# Patient Record
Sex: FEMALE | Race: WHITE | Employment: FULL TIME | URBAN - METROPOLITAN AREA
[De-identification: names, ages, dates, MRNs, and addresses within clinical notes are randomized per-mention and may not be internally consistent; named-entity substitution may affect disease eponyms.]

---

## 2017-11-30 LAB
AVERAGE GLUCOSE: NORMAL
CHOLESTEROL, TOTAL: 202 MG/DL
CHOLESTEROL/HDL RATIO: NORMAL
HBA1C MFR BLD: 8.3 %
HDLC SERPL-MCNC: 56 MG/DL (ref 35–70)
LDL CHOLESTEROL CALCULATED: 118 MG/DL (ref 0–160)
TRIGL SERPL-MCNC: 166 MG/DL
VLDLC SERPL CALC-MCNC: NORMAL MG/DL

## 2017-12-19 ENCOUNTER — CLINICAL DOCUMENTATION (OUTPATIENT)
Dept: PHARMACY | Facility: CLINIC | Age: 42
End: 2017-12-19

## 2017-12-19 ENCOUNTER — TELEPHONE (OUTPATIENT)
Dept: PHARMACY | Facility: CLINIC | Age: 42
End: 2017-12-19

## 2017-12-19 NOTE — PROGRESS NOTES
Pharmacy Pop Care Documentation:   Patient application received. Patient missing Urine albumin test yearly and MyChart account creation: pending as of today. Letter sent.

## 2017-12-19 NOTE — TELEPHONE ENCOUNTER
Called patient to inform missing dm office note, a1c and lipid. No answer left sophy BOUDREAUX  [address] [facility]  Direct: [phone]  Department, toll free: [phone], option 7

## 2017-12-19 NOTE — PROGRESS NOTES
Pharmacy Pop Care Documentation:      The application for Nichelle Smith for enrollment into the diabetes management program has been reviewed and accepted on 12/19/17.     [address] [facility] Documentation:     AVS received for required office visit on: 11/30/17

## 2018-01-10 ENCOUNTER — SCHEDULED TELEPHONE ENCOUNTER (OUTPATIENT)
Dept: PHARMACY | Facility: CLINIC | Age: 43
End: 2018-01-10

## 2018-01-10 RX ORDER — SUMATRIPTAN 100 MG/1
100 TABLET, FILM COATED ORAL
COMMUNITY

## 2018-01-10 NOTE — LETTER
[address]  [address] [facility], Yani [address] 10  Phone: [phone], option 7  Fax: [facility]   [address]           01/12/18     Joselyn Vela,    You are currently enrolled in the [address] [place] [address]. After your recent pharmacist visit, the below were identified as opportunities to assist you with your diabetes management. As we discussed:  - Please be sure to activate your [employer] account. This is a requirement of the program to remain eligible. An activation code has been emailed again to [email or web address], or you can call the [address] [place] [address] at [phone]. - Discuss \"statin\" (such as atorvastatin) and \"ACEi/ARB\" (such as lisinopril or losartan) with your provider and whether either of these are appropriate for you. If not, please have your provider send documentation as to why (such as blood pressure is normal and no protein in urine).   - Refill requests for Invokamet and the Agamatrix test strips and lancets were sent to Cash Lopez MD.  - Pneumococcal vaccine at PCP appt or available for $0 at local outpatient pharmacies (as long as not a [facility] or [facility] pharmacy)     Current medications eligible for copay waiver, up to $600, through [facility] ([facility]) (mail order) Pharmacy:  - Josiah Estes  - Generic ([address] [facility] pharmacy-stocked) insulin syringes and pen needles  - Agamatrix meter and supplies  Mail order pharmacy: [phone], option 0 - please allow 2 weeks for processing and shipping prescriptions; you will also still need to put a form of payment on file (if you haven't done so already)      (please see next page)  DM program gaps identified to remain eligible:   - Initial requirements, to be completed by 7/1/18: OV with provider for DM (1st), A1c (1st), On statin or contraindication(s) and On ACEi/ARB or contraindication(s)

## 2018-01-10 NOTE — TELEPHONE ENCOUNTER
[facility]) Employee Diabetes Program  =================================================================  Berto Mcdowell is a 38 y.o. female enrolled in the [address] [place] [address] Diabetes Program.    Medications:  As per current medication list (updated)  - SMBG: One Touch Ultra Mini (sts she \"should check once a day, but I don't\") - currently using these up, then does have the Agamatrix on hand when she runs out     Allergies:  No Known Allergies     Vitals/Labs:  BP Readings from Last 3 Encounters:   02/08/17 116/72   06/09/15 104/84   11/14/14 128/74     No results found for: Jewell Hidalgo  No results found for: LABA1C   From 12/19/17 Scan:  11/30/17 labs -  A1c: 8.3  TC: 202  HDL: 56  LDL: 118  (and note that UA not done)    Lab Results   Component Value Date    CHOL 193 02/06/2015     Lab Results   Component Value Date    TRIG 181 (H) 02/06/2015     Lab Results   Component Value Date    HDL 56 02/06/2015     Lab Results   Component Value Date    LDLCALC 101 (H) 02/06/2015     ALT   Date Value Ref Range Status   02/06/2015 50 (H) 0 - 32 U/L Final     Risk Factor Units Value   Sex M (for males) or F (for females) f   Age years 38   Race AA (for  Americans) or WH (for whites or others) wh   Total Cholesterol mg/dL 202   HDL-Cholesterol mg/dL 56   Systolic Blood Pressure mm Hg 116   Treatment for High Blood Pressure (if SBP >120) Y (for yes) or N (for no) n   Diabetes Y (for yes) or N (for no) y   Smoker Y (for yes) or N (for no) n     Your 10-Year ASCVD Risk (%) 0.9        CrCl cannot be calculated (Unknown ideal weight.). Immunizations: There is no immunization history on file for this patient.      Smoking Status:  History   Smoking Status    Never Smoker   Smokeless Tobacco    Not on file      ASSESSMENT:  *Will be activating [employer] tomorrow  Initial Program Requirements (to be completed by 7/1/2018):  [] OV with provider for DM (1st)  [] A1c (1st)  [] On statin or contraindication(s) Not

## 2018-01-12 NOTE — ADDENDUM NOTE
Encounter addended by: Kendra Medina MA on: 1/12/2018  1:00 PM<BR>    Actions taken: Letter status changed

## 2018-06-15 ENCOUNTER — EMPLOYEE WELLNESS (OUTPATIENT)
Dept: OTHER | Age: 43
End: 2018-06-15

## 2018-06-15 LAB
CHOLESTEROL, TOTAL: 215 MG/DL (ref 0–199)
GLUCOSE BLD-MCNC: 246 MG/DL (ref 74–107)
HDLC SERPL-MCNC: 66 MG/DL
LDL CHOLESTEROL CALCULATED: 119 MG/DL (ref 0–99)
TRIGL SERPL-MCNC: 149 MG/DL (ref 0–149)

## 2018-06-16 LAB — HBA1C MFR BLD: 8.7 % (ref 4.8–5.9)

## 2018-06-19 VITALS — WEIGHT: 180 LBS | BODY MASS INDEX: 30.9 KG/M2

## 2018-06-25 ENCOUNTER — TELEPHONE (OUTPATIENT)
Dept: PHARMACY | Facility: CLINIC | Age: 43
End: 2018-06-25

## 2018-06-28 ENCOUNTER — CLINICAL DOCUMENTATION (OUTPATIENT)
Dept: PHARMACY | Facility: CLINIC | Age: 43
End: 2018-06-28

## 2018-08-06 ENCOUNTER — CARE COORDINATION (OUTPATIENT)
Dept: CARE COORDINATION | Age: 43
End: 2018-08-06

## 2018-08-06 NOTE — CARE COORDINATION
RD outreach regarding employee beneficiary diabetes program. LM and introduced self/ explained reason for call. Request for call back to discuss DM management. RD left contact information. If no return call in one month, RD will f/u again.  RD signed onto care team.

## 2018-09-05 ENCOUNTER — CARE COORDINATION (OUTPATIENT)
Dept: CARE COORDINATION | Age: 43
End: 2018-09-05

## 2018-11-09 ENCOUNTER — TELEPHONE (OUTPATIENT)
Dept: PHARMACY | Facility: CLINIC | Age: 43
End: 2018-11-09

## 2018-11-09 NOTE — TELEPHONE ENCOUNTER
[facility]) Employee Diabetes Program  =================================================================  Ely Ovalle is a 41 y.o. female enrolled in the [address] [place] [address] Diabetes Program.    Program Requirements that need to be completed by 12/31/18 to remain eligible for program:  [] OV with provider for DM (2nd)  [] ACC/diabetes educator visit (if A1c over 8%) - has had RD outreach but not engaged, please call [phone] to schedule. [] A1c (2nd)  [] Urine protein  [] Pneumococcal vaccination: up to date  [] Influenza vaccination for 1355-2071 - should receive as requirement of employment  [] On ACEi/ARB or contraindication(s) - unable to assess as no urine albumin on file; /74 mmHg at 4/25/18 OV    Per pt's program application, preferred time/phone number to be contacted: work - [phone] - 8:30-5    Has read recent [address] reminder. Attempting to contact patient to discuss. No answer, LM at mobile number to please call back at [phone] option 7. Called home number, pt unavailable. Called work number listed on application ([phone]) - was transferred to pt's personal phone so LM to please call back.     Rebecca Ennis, PharmD, [address] Pharmacist  Direct: [phone]  Dept: [phone] (toll free [phone], option 7)

## 2018-12-07 ENCOUNTER — TELEPHONE (OUTPATIENT)
Dept: PHARMACY | Facility: CLINIC | Age: 43
End: 2018-12-07

## 2018-12-07 NOTE — TELEPHONE ENCOUNTER
[facility]) Employee Diabetes Program  =================================================================  Anthony Favorite is a 41 y.o. female enrolled in the [address] [place] [address] Diabetes Program.    Program Requirements that need to be completed by 12/31/18 to remain eligible for program:  [] OV with provider for DM (2nd)  [] ACC/diabetes educator visit (if A1c over 8%) - has had RD outreach but not engaged, please call [phone] to schedule. [] A1c (2nd)  [] Urine protein  [] Pneumococcal vaccination: up to date  [] Influenza vaccination for 0367-1004 - should receive as requirement of employment  [] On ACEi/ARB or contraindication(s) - unable to assess as no urine albumin on file; /74 mmHg at 4/25/18 OV    Attempting to contact patient at mobile number (preferred number per previous encounter) to discuss. No answer, LM to please call back at [phone] option 7.      Elizabeth Benz, PharmD, [address] Pharmacist  Direct: [phone]  Dept: [phone] (toll free [phone], option 7)

## 2018-12-07 NOTE — LETTER
[address]  [address], Yani [place] 10  Phone: [phone], option [address]   [address] [facility] [address]           12/13/18     Dear Anthony Auite,    Please note: This is the FINAL notice for the missing requirements listed below for the [facility]) Diabetes Management Program. If these requirements are not completed by December 31st, 2018, then you will be disenrolled from the program.    ? Be taking an \"ACEi/ARB\" medication, such as lisinopril or losartan, for blood pressure and/or to protect your kidneys (or provide documentation from your provider that you are not a candidate for one of these medications)   ? Second yearly visit with your provider  ? Second A1C  ? Urine microalbumin (once yearly)  ? Pneumonia vaccination (appears you are due for [id number]. This can be completed at a provider office visit or is available at a local in-network vaccination pharmacy (NOT a [facility] or [facility] pharmacy))  ? Engage with a [facility] ([facility]) Diabetes Educator at one of our hospital locations or an [address] [place] [address] by phone two (2) or more times. ? Please call [phone] to schedule. You must submit documentation of completion of requirements if your provider does not use the [address] [facility] charting system or if completed outside of [facility]. Return the documentation to [email or web address] or by fax at [phone]. Thank you,    [address] [place] [address],[address] Floor Team   [phone] Option #7   Email: [email or web address]   Fax Number: [phone]

## 2018-12-20 ENCOUNTER — HOSPITAL ENCOUNTER (OUTPATIENT)
Age: 43
Discharge: HOME | End: 2018-12-20
Payer: COMMERCIAL

## 2018-12-20 LAB
ALBUMIN SERPL-MCNC: 4.2 G/DL (ref 3.5–5.2)
ALP BLD-CCNC: 134 U/L (ref 35–104)
ALT SERPL-CCNC: 16 U/L (ref 0–32)
ANION GAP SERPL CALCULATED.3IONS-SCNC: 10 MMOL/L (ref 7–16)
AST SERPL-CCNC: 14 U/L (ref 0–31)
BASOPHILS ABSOLUTE: 0.07 E9/L (ref 0–0.2)
BASOPHILS RELATIVE PERCENT: 0.9 % (ref 0–2)
BILIRUB SERPL-MCNC: 0.4 MG/DL (ref 0–1.2)
BUN BLDV-MCNC: 12 MG/DL (ref 6–20)
CALCIUM SERPL-MCNC: 9.9 MG/DL (ref 8.6–10.2)
CHLORIDE BLD-SCNC: 100 MMOL/L (ref 98–107)
CHOLESTEROL, TOTAL: 244 MG/DL (ref 0–199)
CO2: 30 MMOL/L (ref 22–29)
CREAT SERPL-MCNC: 0.7 MG/DL (ref 0.5–1)
EOSINOPHILS ABSOLUTE: 0.42 E9/L (ref 0.05–0.5)
EOSINOPHILS RELATIVE PERCENT: 5.2 % (ref 0–6)
GFR AFRICAN AMERICAN: >60
GFR NON-AFRICAN AMERICAN: >60 ML/MIN/1.73
GLUCOSE BLD-MCNC: 220 MG/DL (ref 74–99)
HCT VFR BLD CALC: 46.4 % (ref 34–48)
HDLC SERPL-MCNC: 68 MG/DL
HEMOGLOBIN: 14.7 G/DL (ref 11.5–15.5)
IMMATURE GRANULOCYTES #: 0.02 E9/L
IMMATURE GRANULOCYTES %: 0.2 % (ref 0–5)
LDL CHOLESTEROL CALCULATED: 132 MG/DL (ref 0–99)
LYMPHOCYTES ABSOLUTE: 3.43 E9/L (ref 1.5–4)
LYMPHOCYTES RELATIVE PERCENT: 42.7 % (ref 20–42)
MCH RBC QN AUTO: 28.7 PG (ref 26–35)
MCHC RBC AUTO-ENTMCNC: 31.7 % (ref 32–34.5)
MCV RBC AUTO: 90.4 FL (ref 80–99.9)
MONOCYTES ABSOLUTE: 0.41 E9/L (ref 0.1–0.95)
MONOCYTES RELATIVE PERCENT: 5.1 % (ref 2–12)
NEUTROPHILS ABSOLUTE: 3.69 E9/L (ref 1.8–7.3)
NEUTROPHILS RELATIVE PERCENT: 45.9 % (ref 43–80)
PDW BLD-RTO: 11.5 FL (ref 11.5–15)
PLATELET # BLD: 391 E9/L (ref 130–450)
PMV BLD AUTO: 9.5 FL (ref 7–12)
POTASSIUM SERPL-SCNC: 4.1 MMOL/L (ref 3.5–5)
RBC # BLD: 5.13 E12/L (ref 3.5–5.5)
SODIUM BLD-SCNC: 140 MMOL/L (ref 132–146)
T4 FREE: 1.34 NG/DL (ref 0.93–1.7)
TOTAL PROTEIN: 7.4 G/DL (ref 6.4–8.3)
TRIGL SERPL-MCNC: 218 MG/DL (ref 0–149)
TSH SERPL DL<=0.05 MIU/L-ACNC: 5.97 UIU/ML (ref 0.27–4.2)
VLDLC SERPL CALC-MCNC: 44 MG/DL
WBC # BLD: 8 E9/L (ref 4.5–11.5)

## 2018-12-20 PROCEDURE — 80053 COMPREHEN METABOLIC PANEL: CPT

## 2018-12-20 PROCEDURE — 84443 ASSAY THYROID STIM HORMONE: CPT

## 2018-12-20 PROCEDURE — 36415 COLL VENOUS BLD VENIPUNCTURE: CPT

## 2018-12-20 PROCEDURE — 80061 LIPID PANEL: CPT

## 2018-12-20 PROCEDURE — 85025 COMPLETE CBC W/AUTO DIFF WBC: CPT

## 2018-12-20 PROCEDURE — 84439 ASSAY OF FREE THYROXINE: CPT

## 2018-12-26 ENCOUNTER — CARE COORDINATION (OUTPATIENT)
Dept: CARE COORDINATION | Age: 43
End: 2018-12-26

## 2019-03-11 ENCOUNTER — TELEPHONE (OUTPATIENT)
Dept: PHARMACY | Facility: CLINIC | Age: 44
End: 2019-03-11

## 2019-04-04 ENCOUNTER — PATIENT MESSAGE (OUTPATIENT)
Dept: PHARMACY | Facility: CLINIC | Age: 44
End: 2019-04-04

## 2019-04-10 LAB
AVERAGE GLUCOSE: NORMAL
HBA1C MFR BLD: 7.3 %

## 2019-06-18 ENCOUNTER — TELEPHONE (OUTPATIENT)
Dept: PHARMACY | Facility: CLINIC | Age: 44
End: 2019-06-18

## 2019-06-18 NOTE — TELEPHONE ENCOUNTER
Patient is still missing the following requirement for the DM Program that is due by July 1st, 2019:    1. Diabetes Visit with your physician in 2019 (First yearly visit)   2. Meet with a [facility]) clinical pharmacist in person at a hospital location or by phone   3. First A1C in 2019      Called patient to complete requirements for Diabetes Management Program.     Scheduled PharmD telephone appt for tomorrow at 10:30AM  Provided fax# to provide documentation of other 2 outstanding requirements. Marcia Foster CPhT.   Pharmacy Technician/Administrative [address] [facility]  Department, toll free: [phone], option 7

## 2019-06-19 ENCOUNTER — SCHEDULED TELEPHONE ENCOUNTER (OUTPATIENT)
Dept: PHARMACY | Facility: CLINIC | Age: 44
End: 2019-06-19

## 2019-06-19 RX ORDER — LISINOPRIL 2.5 MG/1
2.5 TABLET ORAL DAILY
COMMUNITY
End: 2020-05-06

## 2019-06-19 RX ORDER — PRAVASTATIN SODIUM 20 MG
20 TABLET ORAL DAILY
COMMUNITY
End: 2020-05-06

## 2019-06-19 NOTE — TELEPHONE ENCOUNTER
[facility]) Employee Diabetes Program  =================================================================  Liam Odell is a 41 y.o. female enrolled in the [address] [place] [address] Diabetes Program.    Medications:  Current Outpatient Medications   Medication Sig Dispense Refill    SUMAtriptan (IMITREX) 100 MG tablet Take 100 mg by mouth once as needed for Migraine      canagliflozin-metformin HCl (INVOKAMET)  MG Take 1 tablet by mouth 2 times daily (with meals)      levothyroxine (SYNTHROID) 100 MCG tablet Take 100 mcg by mouth Daily.  BUPROPION HCL ER, SR, PO Take 300 mg by mouth daily      *Trulicity - added    Current Pharmacy: established with [facility]  Current testing supplies/frequency: did not discuss    Allergies:  No Known Allergies     Vitals/Labs:  BP Readings from Last 3 Encounters:   02/08/17 116/72   06/09/15 104/84   11/14/14 128/74     No results found for: Leila Arely     Lab Results   Component Value Date    LABA1C 8.7 (H) 06/15/2018    LABA1C 8.3 11/30/2017     Lab Results   Component Value Date    CHOL 244 (H) 12/20/2018    TRIG 218 (H) 12/20/2018    HDL 68 12/20/2018    LDLCALC 132 (H) 12/20/2018     ALT   Date Value Ref Range Status   12/20/2018 16 0 - 32 U/L Final     AST   Date Value Ref Range Status   12/20/2018 14 0 - 31 U/L Final     The ASCVD Risk score (Tonya Matt et al., 2013) failed to calculate for the following reasons:     The systolic blood pressure is missing     Lab Results   Component Value Date    CREATININE 0.7 12/20/2018     Immunizations:  Immunization History   Administered Date(s) Administered    Influenza Virus Vaccine 10/16/2018    Pneumococcal Polysaccharide ([id number]) 12/28/2018      Social History:  Social History     Tobacco Use    Smoking status: Never Smoker   Substance Use Topics    Alcohol use: Not on file     ASSESSMENT:  Initial Program Requirements (Y indicates has completed for the year, N indicates needs to be completed by 7/1/19): No - OV with provider for DM (1st)  No - A1c (1st)  Yes? - On statin - pravastatin prescribed by provider in February, but has not yet started - states \"didn't want to add another medicine\"  Yes? - On ACEi/ARB - lisinopril prescribed by provider in February, but has not yet started  - states \"didn't want to add another medicine\"     Ongoing Program Requirements (Y indicates has completed for the year, N indicates needs to be completed by 12/31/19): No - OV with provider for DM (2nd)  No - ACC/diabetes educator visit (if A1c over 8%)  No - A1c (2nd)  No - Lipid panel  No - Urine microalbumin  Yes - Pneumococcal vaccination: up to date  No - Influenza vaccination for Fall 2019 - is [employer]  No - Medication adherence over 70% - appears adherent to bSafe per Savanna gutiérrez hx; Invokament 90-ds 8/31/18, 2/7/19 and 6/17/19 - pt sts she may \"occasionally\" miss    Formulary Medication Review:  Non-formulary or medications with cost-effective alternatives: n/a. Current medications eligible for copay waiver, up to $600, through [facility] ([facility]) (mail order) Pharmacy:  Daphne Crowell, lisinopril, pravastatin  - Generic ([facility] pharmacy-stocked) insulin syringes and pen needles  - Agamatrix or Prodigy meter and supplies     Diabetes Care:   - Glycemic Goal: <7.0% and directed by provider. States her most recent A1c was 7.3 (improved following addition of Trulicity); reports also continues to work on diet. Possibly some Invokamet BID adherence concerns. Other Considerations:  - Blood Pressure Goal: BP less than 140/90 mmHg due to history of DM: Unable to assess if at BP target. Per patient, BPs have been at goal; she is unsure of her urine/microalbumin results. Prescribed lisinopril 2.5mg daily in February, but has not yet started. - Lipids: prescribed pravastatin 20mg daily, but has not yet started.  Unable to calculate 10-year ASCVD risk, with limited info available, however may likely be a

## 2019-06-26 ENCOUNTER — CLINICAL DOCUMENTATION (OUTPATIENT)
Dept: PHARMACY | Facility: CLINIC | Age: 44
End: 2019-06-26

## 2019-09-10 LAB — MICROALBUMIN UR-MCNC: NEGATIVE

## 2019-11-21 ENCOUNTER — TELEPHONE (OUTPATIENT)
Dept: PHARMACY | Facility: CLINIC | Age: 44
End: 2019-11-21

## 2019-12-06 ENCOUNTER — TELEPHONE (OUTPATIENT)
Dept: PHARMACY | Facility: CLINIC | Age: 44
End: 2019-12-06

## 2019-12-09 ENCOUNTER — CLINICAL DOCUMENTATION (OUTPATIENT)
Dept: PHARMACY | Facility: CLINIC | Age: 44
End: 2019-12-09

## 2019-12-21 ENCOUNTER — HOSPITAL ENCOUNTER (OUTPATIENT)
Age: 44
Discharge: HOME | End: 2019-12-21
Payer: COMMERCIAL

## 2019-12-21 LAB
ALBUMIN SERPL-MCNC: 4.2 G/DL (ref 3.5–5.2)
ALP BLD-CCNC: 131 U/L (ref 35–104)
ALT SERPL-CCNC: 12 U/L (ref 0–32)
ANION GAP SERPL CALCULATED.3IONS-SCNC: 12 MMOL/L (ref 7–16)
AST SERPL-CCNC: 16 U/L (ref 0–31)
BASOPHILS ABSOLUTE: 0.06 E9/L (ref 0–0.2)
BASOPHILS RELATIVE PERCENT: 0.8 % (ref 0–2)
BILIRUB SERPL-MCNC: 0.3 MG/DL (ref 0–1.2)
BUN BLDV-MCNC: 12 MG/DL (ref 6–20)
CALCIUM SERPL-MCNC: 9.2 MG/DL (ref 8.6–10.2)
CHLORIDE BLD-SCNC: 97 MMOL/L (ref 98–107)
CHOLESTEROL, TOTAL: 177 MG/DL (ref 0–199)
CO2: 27 MMOL/L (ref 22–29)
CREAT SERPL-MCNC: 0.8 MG/DL (ref 0.5–1)
EOSINOPHILS ABSOLUTE: 0.16 E9/L (ref 0.05–0.5)
EOSINOPHILS RELATIVE PERCENT: 2.2 % (ref 0–6)
GFR AFRICAN AMERICAN: >60
GFR NON-AFRICAN AMERICAN: >60 ML/MIN/1.73
GLUCOSE BLD-MCNC: 157 MG/DL (ref 74–99)
HBA1C MFR BLD: 6.5 % (ref 4–5.6)
HCT VFR BLD CALC: 47.5 % (ref 34–48)
HDLC SERPL-MCNC: 63 MG/DL
HEMOGLOBIN: 15 G/DL (ref 11.5–15.5)
IMMATURE GRANULOCYTES #: 0.01 E9/L
IMMATURE GRANULOCYTES %: 0.1 % (ref 0–5)
LDL CHOLESTEROL CALCULATED: 96 MG/DL (ref 0–99)
LYMPHOCYTES ABSOLUTE: 2.7 E9/L (ref 1.5–4)
LYMPHOCYTES RELATIVE PERCENT: 37.8 % (ref 20–42)
MCH RBC QN AUTO: 29 PG (ref 26–35)
MCHC RBC AUTO-ENTMCNC: 31.6 % (ref 32–34.5)
MCV RBC AUTO: 91.7 FL (ref 80–99.9)
MONOCYTES ABSOLUTE: 0.46 E9/L (ref 0.1–0.95)
MONOCYTES RELATIVE PERCENT: 6.4 % (ref 2–12)
NEUTROPHILS ABSOLUTE: 3.76 E9/L (ref 1.8–7.3)
NEUTROPHILS RELATIVE PERCENT: 52.7 % (ref 43–80)
PDW BLD-RTO: 11.7 FL (ref 11.5–15)
PLATELET # BLD: 414 E9/L (ref 130–450)
PMV BLD AUTO: 9.5 FL (ref 7–12)
POTASSIUM SERPL-SCNC: 3.8 MMOL/L (ref 3.5–5)
RBC # BLD: 5.18 E12/L (ref 3.5–5.5)
SODIUM BLD-SCNC: 136 MMOL/L (ref 132–146)
TOTAL PROTEIN: 7.7 G/DL (ref 6.4–8.3)
TRIGL SERPL-MCNC: 91 MG/DL (ref 0–149)
TSH SERPL DL<=0.05 MIU/L-ACNC: 0.82 UIU/ML (ref 0.27–4.2)
VLDLC SERPL CALC-MCNC: 18 MG/DL
WBC # BLD: 7.2 E9/L (ref 4.5–11.5)

## 2019-12-21 PROCEDURE — 80053 COMPREHEN METABOLIC PANEL: CPT

## 2019-12-21 PROCEDURE — 85025 COMPLETE CBC W/AUTO DIFF WBC: CPT

## 2019-12-21 PROCEDURE — 80061 LIPID PANEL: CPT

## 2019-12-21 PROCEDURE — 83036 HEMOGLOBIN GLYCOSYLATED A1C: CPT

## 2019-12-21 PROCEDURE — 84443 ASSAY THYROID STIM HORMONE: CPT

## 2019-12-21 PROCEDURE — 36415 COLL VENOUS BLD VENIPUNCTURE: CPT

## 2020-05-05 ENCOUNTER — TELEPHONE (OUTPATIENT)
Dept: PHARMACY | Facility: CLINIC | Age: 45
End: 2020-05-05

## 2020-05-06 ENCOUNTER — SCHEDULED TELEPHONE ENCOUNTER (OUTPATIENT)
Dept: PHARMACY | Facility: CLINIC | Age: 45
End: 2020-05-06

## 2020-05-06 RX ORDER — BUPROPION HYDROCHLORIDE 100 MG/1
100 TABLET, EXTENDED RELEASE ORAL DAILY
COMMUNITY
Start: 2020-05-04

## 2020-05-06 RX ORDER — LEVOTHYROXINE SODIUM 112 UG/1
112 TABLET ORAL DAILY
COMMUNITY
Start: 2020-05-04

## 2020-05-06 RX ORDER — ERTUGLIFLOZIN AND METFORMIN HYDROCHLORIDE 2.5; 5 MG/1; MG/1
1 TABLET, FILM COATED ORAL 2 TIMES DAILY
COMMUNITY
Start: 2020-03-30

## 2020-08-05 ENCOUNTER — HOSPITAL ENCOUNTER (OUTPATIENT)
Age: 45
Discharge: HOME | End: 2020-08-05
Payer: COMMERCIAL

## 2020-08-05 LAB
ALBUMIN SERPL-MCNC: 4 G/DL (ref 3.5–5.2)
ALP BLD-CCNC: 151 U/L (ref 35–104)
ALT SERPL-CCNC: 12 U/L (ref 0–32)
ANION GAP SERPL CALCULATED.3IONS-SCNC: 12 MMOL/L (ref 7–16)
AST SERPL-CCNC: 17 U/L (ref 0–31)
BASOPHILS ABSOLUTE: 0.06 E9/L (ref 0–0.2)
BASOPHILS RELATIVE PERCENT: 0.8 % (ref 0–2)
BILIRUB SERPL-MCNC: 0.4 MG/DL (ref 0–1.2)
BUN BLDV-MCNC: 6 MG/DL (ref 6–20)
CALCIUM SERPL-MCNC: 9.3 MG/DL (ref 8.6–10.2)
CHLORIDE BLD-SCNC: 98 MMOL/L (ref 98–107)
CHOLESTEROL, TOTAL: 182 MG/DL (ref 0–199)
CO2: 28 MMOL/L (ref 22–29)
CREAT SERPL-MCNC: 0.8 MG/DL (ref 0.5–1)
EOSINOPHILS ABSOLUTE: 0.17 E9/L (ref 0.05–0.5)
EOSINOPHILS RELATIVE PERCENT: 2.3 % (ref 0–6)
GFR AFRICAN AMERICAN: >60
GFR NON-AFRICAN AMERICAN: >60 ML/MIN/1.73
GLUCOSE BLD-MCNC: 202 MG/DL (ref 74–99)
HBA1C MFR BLD: 7.5 % (ref 4–5.6)
HCT VFR BLD CALC: 46 % (ref 34–48)
HDLC SERPL-MCNC: 60 MG/DL
HEMOGLOBIN: 14.8 G/DL (ref 11.5–15.5)
IMMATURE GRANULOCYTES #: 0.02 E9/L
IMMATURE GRANULOCYTES %: 0.3 % (ref 0–5)
LDL CHOLESTEROL CALCULATED: 98 MG/DL (ref 0–99)
LYMPHOCYTES ABSOLUTE: 2.52 E9/L (ref 1.5–4)
LYMPHOCYTES RELATIVE PERCENT: 34.4 % (ref 20–42)
MCH RBC QN AUTO: 29.2 PG (ref 26–35)
MCHC RBC AUTO-ENTMCNC: 32.2 % (ref 32–34.5)
MCV RBC AUTO: 90.9 FL (ref 80–99.9)
MONOCYTES ABSOLUTE: 0.39 E9/L (ref 0.1–0.95)
MONOCYTES RELATIVE PERCENT: 5.3 % (ref 2–12)
NEUTROPHILS ABSOLUTE: 4.17 E9/L (ref 1.8–7.3)
NEUTROPHILS RELATIVE PERCENT: 56.9 % (ref 43–80)
PDW BLD-RTO: 11.9 FL (ref 11.5–15)
PLATELET # BLD: 357 E9/L (ref 130–450)
PMV BLD AUTO: 9.6 FL (ref 7–12)
POTASSIUM SERPL-SCNC: 4.5 MMOL/L (ref 3.5–5)
RBC # BLD: 5.06 E12/L (ref 3.5–5.5)
SODIUM BLD-SCNC: 138 MMOL/L (ref 132–146)
TOTAL PROTEIN: 7.3 G/DL (ref 6.4–8.3)
TRIGL SERPL-MCNC: 119 MG/DL (ref 0–149)
TSH SERPL DL<=0.05 MIU/L-ACNC: 1.4 UIU/ML (ref 0.27–4.2)
VLDLC SERPL CALC-MCNC: 24 MG/DL
WBC # BLD: 7.3 E9/L (ref 4.5–11.5)

## 2020-08-05 PROCEDURE — 85025 COMPLETE CBC W/AUTO DIFF WBC: CPT

## 2020-08-05 PROCEDURE — 80053 COMPREHEN METABOLIC PANEL: CPT

## 2020-08-05 PROCEDURE — 80061 LIPID PANEL: CPT

## 2020-08-05 PROCEDURE — 84443 ASSAY THYROID STIM HORMONE: CPT

## 2020-08-05 PROCEDURE — 83036 HEMOGLOBIN GLYCOSYLATED A1C: CPT

## 2020-08-05 PROCEDURE — 36415 COLL VENOUS BLD VENIPUNCTURE: CPT

## 2020-08-13 ENCOUNTER — TELEPHONE (OUTPATIENT)
Dept: PHARMACY | Facility: CLINIC | Age: 45
End: 2020-08-13

## 2020-08-13 NOTE — TELEPHONE ENCOUNTER
Pharmacy Pop Care Documentation:   Called patient with reminder for requirements for Diabetes Management Program.     According to our records, patient is missing the following requirement(s) that must be completed by September 23, 2020:   · 1st 2020 Provider Visit for DM     Spoke to patient at home number and advised them of the above information. Patient verified understanding and did see her Provider in February 2020. Patient also has a Tele-Medicine appointment today: 8/13/20 so she will ask her Provider to fax documentation of her appointments.     Radha Mckinney, [facility] [employer] [facility]   Department, toll free: [phone], option 7

## 2020-08-17 ENCOUNTER — CLINICAL DOCUMENTATION (OUTPATIENT)
Dept: PHARMACY | Facility: CLINIC | Age: 45
End: 2020-08-17

## 2021-01-06 ENCOUNTER — TELEPHONE (OUTPATIENT)
Dept: PHARMACY | Facility: CLINIC | Age: 46
End: 2021-01-06

## 2021-01-06 NOTE — TELEPHONE ENCOUNTER
Called patient to schedule pharmacist appointment to discuss medications for Diabetes Management Program.     No answer. Left VM on homel TAD: Please call back at [phone] Option #7 to retrieve the above message. [employer]. Marcia Foster CPhT.   Pharmacy [facility]  Department, toll free: [phone], option 7

## 2021-01-06 NOTE — LETTER
Margaret oMore   [address]           01/11/21     Dear Tristin Herrera! You have completed the 2020 requirements for the [address] will automatically be re-enrolled into the [facility]) Diabetes Management Program for 2021. One of the requirements to participate in the [facility] Diabetes Management Program is to complete a Clinical Pharmacist Telephone appointment yearly. We would like to work with you and your doctor to:  - Review your medications, including over-the-counter and herbal medications  - Answer questions about your medications and how to get the most benefit from them  - Identify potential drug interactions or side effects and help fix them  - Identify preferred medications that are equally effective, but available at a lower cost to you  - Help you reach the necessary requirements to remain enrolled in the Diabetes Management Program offered by [facility]     Please call [phone] and select option #7 to schedule this appointment to take advantage of this service. Telephone appointments are available Monday thru Friday from 7:30 AM till 5:30 PM.     This is a courtesy reminder. If you have this appointment already scheduled for your 2020 enrollment in the program, please disregard this message. If you have not scheduled this appointment yet, please contact us at the above number to schedule.      Sincerely,      [address]  Phone: [phone], option 7

## 2021-01-11 NOTE — TELEPHONE ENCOUNTER
2nd Attempt Documentation:  2nd attempt to contact this patient regarding the previous message  CLINICAL PHARMACY: 2021 Pharmacist Appointment  Patient unavailable at the time of call. Left following message on home TAD: please call back at toll-free [phone] option 7 to retrieve previous message. Letter mailed to patient.

## 2021-04-14 ENCOUNTER — TELEPHONE (OUTPATIENT)
Dept: PHARMACY | Facility: CLINIC | Age: 46
End: 2021-04-14

## 2021-04-14 NOTE — TELEPHONE ENCOUNTER
Called patient to schedule 2021 yearly pharmacist appointment to discuss medications for Diabetes Management Program.    No answer. Left VM on home/cell TAD: Please call back at [phone] Option #7.      Shahbaz Orozco, [address]   Department, toll free: [phone], option 7

## 2021-04-20 NOTE — TELEPHONE ENCOUNTER
Second attempt made to contact patient to schedule 2021 yearly pharmacist appointment to discuss medications for Diabetes Management Program.    Spoke to patient and appointment scheduled for 4/27/21 at [address] [facility], [address] [employer] [facility]   Department, toll free: [phone], option 7

## 2021-04-27 ENCOUNTER — SCHEDULED TELEPHONE ENCOUNTER (OUTPATIENT)
Dept: PHARMACY | Facility: CLINIC | Age: 46
End: 2021-04-27

## 2021-04-27 RX ORDER — ATORVASTATIN CALCIUM 10 MG/1
10 TABLET, FILM COATED ORAL DAILY
COMMUNITY

## 2021-04-27 NOTE — TELEPHONE ENCOUNTER
[facility] CLINICAL PHARMACY REVIEW - Be Well with Diabetes    Stephanie Sorto is a 39 y.o. female enrolled in the [address] [place] [address],[address] Employee Diabetes Program. Patient provided Elba Boone with verbal consent to remain in the program for this year. Patient enrolled 2018. Medications:  Current Outpatient Medications   Medication Sig Dispense Refill    metFORMIN (GLUCOPHAGE) 500 MG tablet Take 500 mg by mouth 2 times daily (with meals)      atorvastatin (LIPITOR) 10 MG tablet Take 10 mg by mouth daily      SEGLUROMET 2.5-500 MG TABS Take 1 tablet by mouth 2 times daily      levothyroxine (SYNTHROID) 112 MCG tablet Take 112 mcg by mouth daily      buPROPion (WELLBUTRIN SR) 100 MG extended release tablet Take 100 mg by mouth daily      Dulaglutide (TRULICITY) 1.5 RJ/5.5QP SOPN Inject 1.5 mg into the skin once a week      SUMAtriptan (IMITREX) 100 MG tablet Take 100 mg by mouth once as needed for Migraine       No current facility-administered medications for this visit. Current Pharmacy: [facility]  Current testing supplies/frequency: Prodigy 3-4x per week  Pen needles/syringes: n/a    Allergies:  No Known Allergies   Vitals/Labs:  BP Readings from Last 3 Encounters:   02/08/17 116/72   06/09/15 104/84   11/14/14 128/74     Lab Results   Component Value Date    LABMICR Negative 09/10/2019     Lab Results   Component Value Date    LABA1C 7.5 (H) 08/05/2020    LABA1C 6.5 (H) 12/21/2019    LABA1C 7.3 04/10/2019     Lab Results   Component Value Date    CHOL 182 08/05/2020    TRIG 119 08/05/2020    HDL 60 08/05/2020    LDLCALC 98 08/05/2020     ALT   Date Value Ref Range Status   08/05/2020 12 0 - 32 U/L Final     AST   Date Value Ref Range Status   08/05/2020 17 0 - 31 U/L Final     The ASCVD Risk score (Glenny Hodge., et al., 2013) failed to calculate for the following reasons:     The systolic blood pressure is missing     Lab Results   Component Value Date    CREATININE 0.8 08/05/2020     Lab Results   Component Value Date    LABGLOM >60 08/05/2020    LABGLOM >60 12/21/2019    LABGLOM >60 12/20/2018    LABGLOM >60 02/06/2015       Immunizations:  Immunization History   Administered Date(s) Administered    Influenza Virus Vaccine 10/16/2018, 10/22/2019, 10/20/2020    Pneumococcal Polysaccharide ([id number]) 12/28/2018      Social History:  Social History     Tobacco Use    Smoking status: Never Smoker   Substance Use Topics    Alcohol use: Not on file     ASSESSMENT:  Initial Program Requirements (Y indicates has completed for the year, N indicates needs to be completed by 07/01/2021): No - Provider Visit for DM (1st)- Outside pcp- will be faxing us documentation  No - A1c (1st)- gets done at [facility]. Has not updated yet this year. Aware of 7/1 deadline     Ongoing Program Requirements (Y indicates has completed for the year, N indicates needs to be completed by 12/31/2021): No - Provider Visit for DM (2nd)  Yes - ACC/diabetes educator visit (if A1c over 8%)  No - A1c (2nd)  No - Lipid panel  No - Urine microalbumin- Did not have in 2020. Aware that she needs to have done this year. Yes - Pneumococcal vaccination: Up-to-date, not needed again until age 74  Yes - Influenza vaccination for Fall 2021- employee  Yes - Medication adherence over 70%- Per [facility]on refill history  Yes - On statin or contraindication(s) Atorvastatin 10mg  No - On ACEi/ARB or contraindication(s) Not identified - Likely override, but need to get an updated TERESA. Current medications eligible for copay waiver, up to $600, through [address]:  - Trulicity, Segluromet, Metformin, Levothyroxine, Atorvastatin  - Prodigy     Diabetes Care:   - Glycemic Goal: <7.0%. Is not at blood glucose goal. Type 2 DM under fair control as evidenced by A1c between 7-8%. - Home blood sugar records:  fasting range: 130-180, patient tests 3 time(s) per week.   Reports that she knows she is not at goal.  - Reduce Pill Sherman: She is not interested in switching to Segluromet 2.5-1000mg. She cannot swallow the larger metformin pill which is why she currently uses the lower dose of Segluromet and metformin. - Therapy Optimization: Recommended possible increase to Segluromet 2.7-003GN BID and/or Trulicity 3mg weekly. Will make patient aware of options to discuss at outside pcp, and will possibly send fax depending on her next A1c.  - Adherent to ACEi/ARB and/or statin: Adherent to Atorvastatin per [facility] refill history. - Medication compliance: compliant all of the time  - Diet compliance: compliant most of the time- Reports that she attributes her higher readings to excess carbs and sweets. She is working to reduce these. Other Considerations:  - Blood Pressure Goal: BP less than 140/90 mmHg due to history of DM: Unable to assess if at BP target. Patient reports that she is, but we cannot assess as she has not [facility].  - Lipids: Patient is prescribed moderate-intensity statin therapy. PLAN:  - Consideration(s) for provider:   · None at this time. Patient to focus on diet and have an a1c completed soon.  May revisit after A1c  - DM program gaps identified:   · Initial requirements: Provider Visit for DM (1st) and A1c (1st)   · Ongoing requirements: Provider visit for DM (2nd), A1c (2nd), Lipid panel and Urine microalbumin   - Education to patient: Discussed general issues about diabetes pathophysiology and management., Addressed diet and exercise, Overview of Be Well With Diabetes program, Overview of [facility]HP, Benefit/indication for ACE/ARB in patients with diabetes and Reminder A1c and lipid panel can be completed for free at Be Well screenings   - Follow up: PCP for identified gaps or as scheduled below  - Upcoming appointments:   Future Appointments   Date Time Provider Tammy Bell   4/27/2021  4:30 PM SCHEDULE, [facility] None

## 2021-06-11 ENCOUNTER — TELEPHONE (OUTPATIENT)
Dept: PHARMACY | Facility: CLINIC | Age: 46
End: 2021-06-11

## 2021-06-11 NOTE — TELEPHONE ENCOUNTER
Pharmacy Pop Care Documentation:   Called patient with reminder for requirements for Diabetes Management Program.     According to our records, patient is missing the following requirement(s) that must be completed by July 1st 2021:   · 1st 2021 Provider Visit for DM  · 1st 2021 A1C      Spoke to patient at [phone] number and advised them of the above information. Patient verified understanding.      Chucho Guerrier, Pharmacy    Preeti Revolucijjose angel    Department, toll free: [phone], option 7

## 2021-06-19 ENCOUNTER — HOSPITAL ENCOUNTER (OUTPATIENT)
Age: 46
Discharge: HOME | End: 2021-06-19
Payer: COMMERCIAL

## 2021-06-19 LAB
ALBUMIN SERPL-MCNC: 3.8 G/DL (ref 3.5–5.2)
ALP BLD-CCNC: 133 U/L (ref 35–104)
ALT SERPL-CCNC: 15 U/L (ref 0–32)
ANION GAP SERPL CALCULATED.3IONS-SCNC: 10 MMOL/L (ref 7–16)
AST SERPL-CCNC: 20 U/L (ref 0–31)
BASOPHILS ABSOLUTE: 0.06 E9/L (ref 0–0.2)
BASOPHILS RELATIVE PERCENT: 0.8 % (ref 0–2)
BILIRUB SERPL-MCNC: 0.3 MG/DL (ref 0–1.2)
BUN BLDV-MCNC: 12 MG/DL (ref 6–20)
CALCIUM SERPL-MCNC: 9.1 MG/DL (ref 8.6–10.2)
CHLORIDE BLD-SCNC: 102 MMOL/L (ref 98–107)
CHOLESTEROL, TOTAL: 160 MG/DL (ref 0–199)
CO2: 26 MMOL/L (ref 22–29)
CREAT SERPL-MCNC: 0.6 MG/DL (ref 0.5–1)
EOSINOPHILS ABSOLUTE: 0.29 E9/L (ref 0.05–0.5)
EOSINOPHILS RELATIVE PERCENT: 4 % (ref 0–6)
GFR AFRICAN AMERICAN: >60
GFR NON-AFRICAN AMERICAN: >60 ML/MIN/1.73
GLUCOSE BLD-MCNC: 191 MG/DL (ref 74–99)
HBA1C MFR BLD: 8 % (ref 4–5.6)
HCT VFR BLD CALC: 42.9 % (ref 34–48)
HDLC SERPL-MCNC: 60 MG/DL
HEMOGLOBIN: 13.9 G/DL (ref 11.5–15.5)
IMMATURE GRANULOCYTES #: 0.02 E9/L
IMMATURE GRANULOCYTES %: 0.3 % (ref 0–5)
LDL CHOLESTEROL CALCULATED: 75 MG/DL (ref 0–99)
LYMPHOCYTES ABSOLUTE: 2.39 E9/L (ref 1.5–4)
LYMPHOCYTES RELATIVE PERCENT: 33.3 % (ref 20–42)
MCH RBC QN AUTO: 29.4 PG (ref 26–35)
MCHC RBC AUTO-ENTMCNC: 32.4 % (ref 32–34.5)
MCV RBC AUTO: 90.7 FL (ref 80–99.9)
MONOCYTES ABSOLUTE: 0.33 E9/L (ref 0.1–0.95)
MONOCYTES RELATIVE PERCENT: 4.6 % (ref 2–12)
NEUTROPHILS ABSOLUTE: 4.08 E9/L (ref 1.8–7.3)
NEUTROPHILS RELATIVE PERCENT: 57 % (ref 43–80)
PDW BLD-RTO: 11.9 FL (ref 11.5–15)
PLATELET # BLD: 369 E9/L (ref 130–450)
PMV BLD AUTO: 9.9 FL (ref 7–12)
POTASSIUM SERPL-SCNC: 4.3 MMOL/L (ref 3.5–5)
RBC # BLD: 4.73 E12/L (ref 3.5–5.5)
SODIUM BLD-SCNC: 138 MMOL/L (ref 132–146)
TOTAL PROTEIN: 6.7 G/DL (ref 6.4–8.3)
TRIGL SERPL-MCNC: 127 MG/DL (ref 0–149)
TSH SERPL DL<=0.05 MIU/L-ACNC: 1.01 UIU/ML (ref 0.27–4.2)
VLDLC SERPL CALC-MCNC: 25 MG/DL
WBC # BLD: 7.2 E9/L (ref 4.5–11.5)

## 2021-06-19 PROCEDURE — 80053 COMPREHEN METABOLIC PANEL: CPT

## 2021-06-19 PROCEDURE — 83036 HEMOGLOBIN GLYCOSYLATED A1C: CPT

## 2021-06-19 PROCEDURE — 36415 COLL VENOUS BLD VENIPUNCTURE: CPT

## 2021-06-19 PROCEDURE — 80061 LIPID PANEL: CPT

## 2021-06-19 PROCEDURE — 85025 COMPLETE CBC W/AUTO DIFF WBC: CPT

## 2021-06-19 PROCEDURE — 84443 ASSAY THYROID STIM HORMONE: CPT

## 2021-06-29 LAB
CREATININE, URINE: NORMAL
MICROALBUMIN/CREAT 24H UR: NORMAL MG/G{CREAT}
MICROALBUMIN/CREAT UR-RTO: <30

## 2021-07-06 ENCOUNTER — CLINICAL DOCUMENTATION (OUTPATIENT)
Dept: PHARMACY | Facility: CLINIC | Age: 46
End: 2021-07-06

## 2021-07-06 NOTE — PROGRESS NOTES
Pharmacy Pop Care Documentation:     AVS received for required office visit on: 6/29/21: Dr. Janette marte office     Spoke to Nubia Toussaint at Dr. Alan Thomson office: [phone] and she confirmed patient was seen on 6/29/21.

## 2021-09-08 ENCOUNTER — HOSPITAL ENCOUNTER (OUTPATIENT)
Age: 46
Discharge: HOME | End: 2021-09-08
Payer: COMMERCIAL

## 2021-09-08 LAB
ALBUMIN SERPL-MCNC: 4.2 G/DL (ref 3.5–5.2)
ALP BLD-CCNC: 111 U/L (ref 35–104)
ALT SERPL-CCNC: 13 U/L (ref 0–32)
ANION GAP SERPL CALCULATED.3IONS-SCNC: 12 MMOL/L (ref 7–16)
AST SERPL-CCNC: 16 U/L (ref 0–31)
BASOPHILS ABSOLUTE: 0.08 E9/L (ref 0–0.2)
BASOPHILS RELATIVE PERCENT: 1 % (ref 0–2)
BILIRUB SERPL-MCNC: 0.3 MG/DL (ref 0–1.2)
BUN BLDV-MCNC: 5 MG/DL (ref 6–20)
CALCIUM SERPL-MCNC: 9.1 MG/DL (ref 8.6–10.2)
CHLORIDE BLD-SCNC: 101 MMOL/L (ref 98–107)
CHOLESTEROL, TOTAL: 137 MG/DL (ref 0–199)
CO2: 27 MMOL/L (ref 22–29)
CREAT SERPL-MCNC: 0.6 MG/DL (ref 0.5–1)
EOSINOPHILS ABSOLUTE: 0.26 E9/L (ref 0.05–0.5)
EOSINOPHILS RELATIVE PERCENT: 3.3 % (ref 0–6)
GFR AFRICAN AMERICAN: >60
GFR NON-AFRICAN AMERICAN: >60 ML/MIN/1.73
GLUCOSE BLD-MCNC: 122 MG/DL (ref 74–99)
HBA1C MFR BLD: 6.9 % (ref 4–5.6)
HCT VFR BLD CALC: 43.2 % (ref 34–48)
HDLC SERPL-MCNC: 61 MG/DL
HEMOGLOBIN: 14.7 G/DL (ref 11.5–15.5)
IMMATURE GRANULOCYTES #: 0.03 E9/L
IMMATURE GRANULOCYTES %: 0.4 % (ref 0–5)
LDL CHOLESTEROL CALCULATED: 57 MG/DL (ref 0–99)
LYMPHOCYTES ABSOLUTE: 3.22 E9/L (ref 1.5–4)
LYMPHOCYTES RELATIVE PERCENT: 40.4 % (ref 20–42)
MCH RBC QN AUTO: 29.8 PG (ref 26–35)
MCHC RBC AUTO-ENTMCNC: 34 % (ref 32–34.5)
MCV RBC AUTO: 87.4 FL (ref 80–99.9)
MONOCYTES ABSOLUTE: 0.4 E9/L (ref 0.1–0.95)
MONOCYTES RELATIVE PERCENT: 5 % (ref 2–12)
NEUTROPHILS ABSOLUTE: 3.98 E9/L (ref 1.8–7.3)
NEUTROPHILS RELATIVE PERCENT: 49.9 % (ref 43–80)
PDW BLD-RTO: 11.5 FL (ref 11.5–15)
PLATELET # BLD: 442 E9/L (ref 130–450)
PMV BLD AUTO: 10 FL (ref 7–12)
POTASSIUM SERPL-SCNC: 4.2 MMOL/L (ref 3.5–5)
RBC # BLD: 4.94 E12/L (ref 3.5–5.5)
SODIUM BLD-SCNC: 140 MMOL/L (ref 132–146)
TOTAL PROTEIN: 7.2 G/DL (ref 6.4–8.3)
TRIGL SERPL-MCNC: 94 MG/DL (ref 0–149)
VLDLC SERPL CALC-MCNC: 19 MG/DL
WBC # BLD: 8 E9/L (ref 4.5–11.5)

## 2021-09-08 PROCEDURE — 80061 LIPID PANEL: CPT

## 2021-09-08 PROCEDURE — 85025 COMPLETE CBC W/AUTO DIFF WBC: CPT

## 2021-09-08 PROCEDURE — 80053 COMPREHEN METABOLIC PANEL: CPT

## 2021-09-08 PROCEDURE — 83036 HEMOGLOBIN GLYCOSYLATED A1C: CPT

## 2021-09-08 PROCEDURE — 36415 COLL VENOUS BLD VENIPUNCTURE: CPT

## 2021-11-29 ENCOUNTER — TELEPHONE (OUTPATIENT)
Dept: PHARMACY | Facility: CLINIC | Age: 46
End: 2021-11-29

## 2021-11-29 NOTE — TELEPHONE ENCOUNTER
[facility] Employee Diabetes Program    Shantanu Luna is a 42 y.o. female enrolled in the [facility] Employee Diabetes Program. The goal of this voluntary program is to help employees and covered dependents reach their health maintenance goals in regards to their diabetes diagnosis. According to our records, patient is missing the following requirement(s) that must be completed by December 31, 2021 to avoid discharge from the program:     Second diabetes visit with your physician in 2021   Urine protein/microalbumin   ACEi/ARB therapy or contraindication- Will need to see TERESA result before assessing. Likely override for 2021      Plan:  Attempt made to reach patient by telephone to review above. Spoke to patient - verbalized understanding. Fax and email provided to patient.     Javi Mckee, PharmD, [address] [facility]  Department, toll free: [phone]       Time Spent (min): 5

## 2021-12-29 ENCOUNTER — CLINICAL DOCUMENTATION (OUTPATIENT)
Dept: PHARMACY | Facility: CLINIC | Age: 46
End: 2021-12-29

## 2021-12-29 NOTE — PROGRESS NOTES
Pharmacy Pop Care Documentation:     AVS received for required office visit on: 9/10/21: Alondra Rowley per scanned document

## 2022-01-20 ENCOUNTER — TELEPHONE (OUTPATIENT)
Dept: PHARMACY | Facility: CLINIC | Age: 47
End: 2022-01-20

## 2022-01-20 NOTE — TELEPHONE ENCOUNTER
Pharmacy Pop Care Documentation:   2022 Annual Pharmacy  Visit     Called patient to complete yearly pharmacy appointment to discuss the 2022 Diabetes Management Program.     Spoke with patient and she'll like a call back on 1/25/22 at 80 at [address] [place] [address] Specialist  Department, toll free: [phone] Option #3

## 2022-01-25 NOTE — TELEPHONE ENCOUNTER
[facility] Employee Diabetes Program - Be Well With Diabetes  =================================================================  Ron Covarrubias is a 42 y.o. female enrolled in the [address] [place] [address] with patient for yearly visit to discuss enrollment in program. Patient provided writer with verbal consent to remain in the program for this year. Program Requirements to be completed in 2022:  1. Two office visits in 2022 for diabetes (1st must be completed by 7/1/2022)  2. Two A1c levels in 2022 (1st must be completed by 7/1/2022)  3. Yearly lipid panel  4. Yearly urine microalbumin test  5. Diabetes education if A1c is over 8%  6. Taking an ACE/ARB medication if appropriate  7. Taking a statin medication if appropriate  8. Pneumonia vaccine up to date  5. Yearly flu shot (for 0914-7209 season)  10. Medication adherence over 70%. Patients who fall below 70% will be contacted by a pharmacist in the program to discuss any issues. Are you currently using [address] [place] [address] (home delivery pharmacy) to get your prescriptions? Yes    Would you like to speak with a pharmacist about the program, your diabetes, and/or your prescriptions? No    [address] [facility] Team  Phone: toll free [phone], option #3  Fax [phone]  Email: [email or web address]    For Pharmacy Admin Tracking Only    PHSO: No  Recommended intervention potential cost savings: 1  Time Spent (min): 15

## 2022-03-01 ENCOUNTER — TELEPHONE (OUTPATIENT)
Dept: PHARMACY | Facility: CLINIC | Age: 47
End: 2022-03-01

## 2022-03-09 ENCOUNTER — TELEPHONE (OUTPATIENT)
Dept: PHARMACY | Facility: CLINIC | Age: 47
End: 2022-03-09

## 2022-03-09 NOTE — LETTER
HOME DELIVERY PRESCRIPTION REQUEST  BE WELL WITH DIABETES PROGRAM  Prescriber:  Kathy Shipman MD  [address] [facility] [address] [place] [address]  Phone: [phone]       Fax: [phone]     Patient:  Mark Correa 1975  [address] [facility] [address]  [phone] (home) [phone] (work)     Rationale:   Patient is enrolled in the [facility] Well With Diabetes program. Her Segluromet is currently on backorder with no estimated date of availability. Can you please send prescriptions for Steglatro and Metformin separately to her mail order pharmacy? This will actually reduce the number of pills she takes each day. She would need to stop the metformin 500mg BID alone also. Covered Medication(s) for Patient[de-identified]    Medication: Steglatro 5mg      Sig: Take 1 tablet daily         #: 90       R: 3  Medication: Metformin 1000mg      Sig: Take 1 tablet twice daily    #:180    R: 3     Prescriber Response:    Prescription(s) approved (please [place] one):  YES  NO    Other response: ________________________________________      ______________________________________   __________________  Authorized By       Date     Pharmacy: [facility] [facility]                        Phone:  [phone]    Gisel Baker, [facility]  Fax:  [phone]    [address]     The information transmitted is intended only for the person or entity to which it is addressed and may contain confidential and/or privileged material. Any review, retransmission, dissemination or other use of, or taking of any action in reliance upon, this information by persons or entities other than the intended recipient is prohibited. This document contains information covered under the Privacy Act, 5 (a), and/or the [employer] and Accountability Act ([address]) and its various implementing regulations and must be protected in accordance with those provisions.  If you received this in error, please contact the sender and delete the material from any computer.

## 2022-03-09 NOTE — TELEPHONE ENCOUNTER
[facility] CLINICAL PHARMACY REVIEW - BE WELL WITH DIABETES  =============================================  Bill Balbuena is a 42 y.o. female enrolled in the [address] [place] [address],[address] Be Well with Diabetes program.  :    [facility] cannot get segluromet. Currently on backorder and unsure when it will be resolved. Spoke with patient and she verified she is using:    Currently on:  - Segluromet 2.5-500mg BID  - Metformin 500mg BID    Plan:  - Will contact provider to ask for scripts of Steglatro 5mg daily and Metformin 1000mg BID. Fax sent and I will followup tomorrow. PA will be needed for [place].  Brigitte Landers, PharmD, [address] [place] [address]  Department, toll free: [phone]

## 2022-03-11 NOTE — TELEPHONE ENCOUNTER
Checked with [facility] today and Provider changed medication to Synjardy 5-1000mg BID. Called patient to make aware and LVM. Will send a quick [employer] message also    MO Chiang, PharmD, [address] [facility]  Department, toll free: [phone]        For Pharmacy [address] in [address]:  No   Recommendation Provided To: Provider: 1 via Fax sent to office   Intervention Detail: New Rx: 1, reason: Cost/Formulary Change   Gap Closed?: Yes    Intervention Accepted By: Provider: 1   Time Spent (min): 15

## 2022-06-10 ENCOUNTER — TELEPHONE (OUTPATIENT)
Dept: PHARMACY | Facility: CLINIC | Age: 47
End: 2022-06-10

## 2022-06-10 ENCOUNTER — PATIENT MESSAGE (OUTPATIENT)
Dept: PHARMACY | Facility: CLINIC | Age: 47
End: 2022-06-10

## 2022-06-10 NOTE — TELEPHONE ENCOUNTER
[address] [facility] Employee Diabetes Program    Sasha Ibarra is a 42 y.o. female enrolled in the [facility] Employee Diabetes Program. The goal of this voluntary program is to help employees and covered dependents reach their health maintenance goals in regards to their diabetes diagnosis. According to our records, patient is missing the following requirement(s) that must be completed by July 1, 2022 to avoid discharge from the program:     First diabetes visit with your physician in 2022   First A1c result in 2022      Plan:  Attempt made to reach patient by telephone to review above. Left voice message for patient to return clinician's phone call to [phone], option 3. [employer] message sent to patient.     Dani Alonso, [employer] [facility]   Department, toll free: [phone] Option #3

## 2022-06-10 NOTE — LETTER
Kasandra 2  [address] [facility]  Phone: toll free [phone] Option #3        [address] [facility] [address]           06/17/22     Dear Zackery Espinal,    Dear Gaye Hicks,     You are currently enrolled in the [facility] Be Well with Diabetes Program. Our records indicate that we are missing the requirements listed below. If these requirements are not completed by July 1, 2022, then you may be disenrolled from the program:     - First A1c result in 2022     You must submit documentation of completion of requirements if your provider does not use the [facility] electronic charting system or if completed outside of the system. Return the documentation to [email or web address] or by fax at [phone]. Please call our office so we can discuss the missing requirements with you to ensure that you will remain enrolled in the [address] [facility] Be Well with Diabetes Program.     Thank you,     Kasandra 2 Team   Phone: toll free [phone], Option #3   Email: [email or web address]   Fax Number: [phone]

## 2022-06-13 ENCOUNTER — CLINICAL DOCUMENTATION (OUTPATIENT)
Dept: PHARMACY | Facility: CLINIC | Age: 47
End: 2022-06-13

## 2022-06-13 NOTE — PROGRESS NOTES
Pharmacy Pop Care Documentation:     AVS received for required office visit on: 1/13/22: Dr. Kaitlin Akers to Dr. Aretha Reina office and requested patients 2022 A1C result if available. Received A1C results from 2022.

## 2022-06-17 NOTE — TELEPHONE ENCOUNTER
[employer] message not read by patient. 1/13/22 DM OV documentation received. Patient still missing 1st 2022 A1C Result. Letter mailed.

## 2022-06-28 ENCOUNTER — HOSPITAL ENCOUNTER (OUTPATIENT)
Age: 47
Discharge: HOME | End: 2022-06-28
Payer: COMMERCIAL

## 2022-06-28 LAB
ALBUMIN SERPL-MCNC: 4.1 G/DL (ref 3.5–5.2)
ALP BLD-CCNC: 117 U/L (ref 35–104)
ALT SERPL-CCNC: 12 U/L (ref 0–32)
ANION GAP SERPL CALCULATED.3IONS-SCNC: 12 MMOL/L (ref 7–16)
AST SERPL-CCNC: 14 U/L (ref 0–31)
BASOPHILS ABSOLUTE: 0.07 E9/L (ref 0–0.2)
BASOPHILS RELATIVE PERCENT: 0.9 % (ref 0–2)
BILIRUB SERPL-MCNC: 0.3 MG/DL (ref 0–1.2)
BUN BLDV-MCNC: 10 MG/DL (ref 6–20)
CALCIUM SERPL-MCNC: 9.2 MG/DL (ref 8.6–10.2)
CHLORIDE BLD-SCNC: 99 MMOL/L (ref 98–107)
CHOLESTEROL, TOTAL: 158 MG/DL (ref 0–199)
CO2: 26 MMOL/L (ref 22–29)
CREAT SERPL-MCNC: 0.6 MG/DL (ref 0.5–1)
CREATININE URINE: 39 MG/DL (ref 29–226)
EOSINOPHILS ABSOLUTE: 0.51 E9/L (ref 0.05–0.5)
EOSINOPHILS RELATIVE PERCENT: 6.2 % (ref 0–6)
GFR AFRICAN AMERICAN: >60
GFR NON-AFRICAN AMERICAN: >60 ML/MIN/1.73
GLUCOSE BLD-MCNC: 116 MG/DL (ref 74–99)
HBA1C MFR BLD: 6.7 % (ref 4–5.6)
HCT VFR BLD CALC: 43.3 % (ref 34–48)
HDLC SERPL-MCNC: 61 MG/DL
HEMOGLOBIN: 14.1 G/DL (ref 11.5–15.5)
IMMATURE GRANULOCYTES #: 0.02 E9/L
IMMATURE GRANULOCYTES %: 0.2 % (ref 0–5)
LDL CHOLESTEROL CALCULATED: 69 MG/DL (ref 0–99)
LYMPHOCYTES ABSOLUTE: 3.7 E9/L (ref 1.5–4)
LYMPHOCYTES RELATIVE PERCENT: 45.1 % (ref 20–42)
MCH RBC QN AUTO: 29.5 PG (ref 26–35)
MCHC RBC AUTO-ENTMCNC: 32.6 % (ref 32–34.5)
MCV RBC AUTO: 90.6 FL (ref 80–99.9)
MICROALBUMIN UR-MCNC: <12 MG/L
MICROALBUMIN/CREAT UR-RTO: ABNORMAL (ref 0–30)
MONOCYTES ABSOLUTE: 0.5 E9/L (ref 0.1–0.95)
MONOCYTES RELATIVE PERCENT: 6.1 % (ref 2–12)
NEUTROPHILS ABSOLUTE: 3.4 E9/L (ref 1.8–7.3)
NEUTROPHILS RELATIVE PERCENT: 41.5 % (ref 43–80)
PDW BLD-RTO: 11.6 FL (ref 11.5–15)
PLATELET # BLD: 395 E9/L (ref 130–450)
PMV BLD AUTO: 9.6 FL (ref 7–12)
POTASSIUM SERPL-SCNC: 4.2 MMOL/L (ref 3.5–5)
RBC # BLD: 4.78 E12/L (ref 3.5–5.5)
SODIUM BLD-SCNC: 137 MMOL/L (ref 132–146)
TOTAL PROTEIN: 6.9 G/DL (ref 6.4–8.3)
TRIGL SERPL-MCNC: 139 MG/DL (ref 0–149)
TSH SERPL DL<=0.05 MIU/L-ACNC: 2.56 UIU/ML (ref 0.27–4.2)
VLDLC SERPL CALC-MCNC: 28 MG/DL
WBC # BLD: 8.2 E9/L (ref 4.5–11.5)

## 2022-06-28 PROCEDURE — 82044 UR ALBUMIN SEMIQUANTITATIVE: CPT

## 2022-06-28 PROCEDURE — 82570 ASSAY OF URINE CREATININE: CPT

## 2022-06-28 PROCEDURE — 36415 COLL VENOUS BLD VENIPUNCTURE: CPT

## 2022-06-28 PROCEDURE — 84443 ASSAY THYROID STIM HORMONE: CPT

## 2022-06-28 PROCEDURE — 80061 LIPID PANEL: CPT

## 2022-06-28 PROCEDURE — 80053 COMPREHEN METABOLIC PANEL: CPT

## 2022-06-28 PROCEDURE — 83036 HEMOGLOBIN GLYCOSYLATED A1C: CPT

## 2022-06-28 PROCEDURE — 85025 COMPLETE CBC W/AUTO DIFF WBC: CPT

## 2022-12-21 ENCOUNTER — TELEPHONE (OUTPATIENT)
Dept: PHARMACY | Facility: CLINIC | Age: 47
End: 2022-12-21

## 2022-12-21 NOTE — TELEPHONE ENCOUNTER
Program Requirements to be completed by December 31st 2022:  2nd office visit in 2022 for diabetes   2nd A1C in [address] [facility] Documentation:   Called patient with reminder for requirements for Diabetes Management Program.     According to our records, patient is missing the following requirement(s) that must be completed by December 31st 2022:     2nd office visit in 2022 for diabetes   2nd A1C in 2022      Patient not available at the time of call. Left message on home TAD with the above information. [employer] message sent to patient on 10/10/22 - patient read on 10/10/22. No further patient outreach planned at this time.      Leigh Knight, [address]   Department, toll free: [phone], option 3     For Pharmacy Admin Tracking Only    Program: [address]:  No  Gap Closed?: No   Time Spent (min): 5

## 2022-12-31 ENCOUNTER — HOSPITAL ENCOUNTER (OUTPATIENT)
Age: 47
Discharge: HOME | End: 2022-12-31
Payer: COMMERCIAL

## 2022-12-31 LAB
ALBUMIN SERPL-MCNC: 4 G/DL (ref 3.5–5.2)
ALP BLD-CCNC: 113 U/L (ref 35–104)
ALT SERPL-CCNC: 15 U/L (ref 0–32)
ANION GAP SERPL CALCULATED.3IONS-SCNC: 8 MMOL/L (ref 7–16)
AST SERPL-CCNC: 21 U/L (ref 0–31)
AVERAGE GLUCOSE: NORMAL
BASOPHILS ABSOLUTE: 0.06 E9/L (ref 0–0.2)
BASOPHILS RELATIVE PERCENT: 0.8 % (ref 0–2)
BILIRUB SERPL-MCNC: 0.5 MG/DL (ref 0–1.2)
BUN BLDV-MCNC: 8 MG/DL (ref 6–20)
CALCIUM SERPL-MCNC: 10.3 MG/DL (ref 8.6–10.2)
CHLORIDE BLD-SCNC: 102 MMOL/L (ref 98–107)
CHOLESTEROL, TOTAL: 125 MG/DL (ref 0–199)
CO2: 29 MMOL/L (ref 22–29)
CREAT SERPL-MCNC: 0.6 MG/DL (ref 0.5–1)
CREATININE URINE: 145 MG/DL (ref 29–226)
EOSINOPHILS ABSOLUTE: 0.16 E9/L (ref 0.05–0.5)
EOSINOPHILS RELATIVE PERCENT: 2.2 % (ref 0–6)
GFR SERPL CREATININE-BSD FRML MDRD: >60 ML/MIN/1.73
GLUCOSE BLD-MCNC: 98 MG/DL (ref 74–99)
HBA1C MFR BLD: 6.4 %
HBA1C MFR BLD: 6.4 % (ref 4–5.6)
HCT VFR BLD CALC: 44.3 % (ref 34–48)
HDLC SERPL-MCNC: 59 MG/DL
HEMOGLOBIN: 14.3 G/DL (ref 11.5–15.5)
IMMATURE GRANULOCYTES #: 0.03 E9/L
IMMATURE GRANULOCYTES %: 0.4 % (ref 0–5)
LDL CHOLESTEROL CALCULATED: 53 MG/DL (ref 0–99)
LYMPHOCYTES ABSOLUTE: 2.33 E9/L (ref 1.5–4)
LYMPHOCYTES RELATIVE PERCENT: 32.2 % (ref 20–42)
MCH RBC QN AUTO: 29.4 PG (ref 26–35)
MCHC RBC AUTO-ENTMCNC: 32.3 % (ref 32–34.5)
MCV RBC AUTO: 91 FL (ref 80–99.9)
MICROALBUMIN UR-MCNC: <12 MG/L
MICROALBUMIN/CREAT UR-RTO: ABNORMAL (ref 0–30)
MONOCYTES ABSOLUTE: 0.31 E9/L (ref 0.1–0.95)
MONOCYTES RELATIVE PERCENT: 4.3 % (ref 2–12)
NEUTROPHILS ABSOLUTE: 4.35 E9/L (ref 1.8–7.3)
NEUTROPHILS RELATIVE PERCENT: 60.1 % (ref 43–80)
PDW BLD-RTO: 11.5 FL (ref 11.5–15)
PLATELET # BLD: 433 E9/L (ref 130–450)
PMV BLD AUTO: 9.5 FL (ref 7–12)
POTASSIUM SERPL-SCNC: 4.5 MMOL/L (ref 3.5–5)
RBC # BLD: 4.87 E12/L (ref 3.5–5.5)
SODIUM BLD-SCNC: 139 MMOL/L (ref 132–146)
TOTAL PROTEIN: 7.1 G/DL (ref 6.4–8.3)
TRIGL SERPL-MCNC: 63 MG/DL (ref 0–149)
TSH SERPL DL<=0.05 MIU/L-ACNC: 0.19 UIU/ML (ref 0.27–4.2)
VLDLC SERPL CALC-MCNC: 13 MG/DL
WBC # BLD: 7.2 E9/L (ref 4.5–11.5)

## 2022-12-31 PROCEDURE — 80061 LIPID PANEL: CPT

## 2022-12-31 PROCEDURE — 80053 COMPREHEN METABOLIC PANEL: CPT

## 2022-12-31 PROCEDURE — 82044 UR ALBUMIN SEMIQUANTITATIVE: CPT

## 2022-12-31 PROCEDURE — 84443 ASSAY THYROID STIM HORMONE: CPT

## 2022-12-31 PROCEDURE — 82570 ASSAY OF URINE CREATININE: CPT

## 2022-12-31 PROCEDURE — 83036 HEMOGLOBIN GLYCOSYLATED A1C: CPT

## 2022-12-31 PROCEDURE — 36415 COLL VENOUS BLD VENIPUNCTURE: CPT

## 2022-12-31 PROCEDURE — 85025 COMPLETE CBC W/AUTO DIFF WBC: CPT

## 2023-01-20 ENCOUNTER — CLINICAL DOCUMENTATION (OUTPATIENT)
Dept: PHARMACY | Facility: CLINIC | Age: 48
End: 2023-01-20

## 2023-01-20 NOTE — PROGRESS NOTES
For Pharmacy Admin Tracking Only    Program: [address]:  No  Gap Closed?: Yes   Time Spent (min): 10

## 2023-01-20 NOTE — PROGRESS NOTES
Pharmacy Pop Care Documentation:     AVS received for required office visit on:  1/12/23 : Dr. Lobo Galo per call to office spoke to [place].        Ramiro Martínez, [address] [facility] Pharmacy   Phone: [phone]

## 2023-02-22 ENCOUNTER — TELEPHONE (OUTPATIENT)
Dept: PHARMACY | Facility: CLINIC | Age: 48
End: 2023-02-22

## 2023-02-22 NOTE — TELEPHONE ENCOUNTER
Pharmacy Pop Care Documentation:   2023 Annual Pharmacy  Visit     Called patient to complete yearly pharmacy appointment to discuss the 2023 Diabetes Management Program.     No answer. Left VM. Please call back at [phone] Option #3.       [address]  Clinical Pharmacy   Phone: toll free [phone], option 3

## 2023-03-01 NOTE — TELEPHONE ENCOUNTER
Second attempt made to contact patient to complete 2023 yearly pharmacy appointment for Diabetes Management Program.    No answer. Left VM. [employer] message sent to patient.         Carrington Washington, [address]   Phone: [phone], option #3       For Pharmacy Admin Tracking Only    Program: [address]:  No  Gap Closed?: No   Time Spent (min): 10

## 2023-03-01 NOTE — TELEPHONE ENCOUNTER
[address] [facility] Employee Diabetes Program - Be Well With Diabetes  =================================================================  Annmarie Henriquez is a 41 y.o. female enrolled in the [address] [place] [address] with patient for yearly visit to discuss enrollment in program. Patient provided writer with verbal consent to remain in the program for this year. Program Requirements to be completed in 2023:  Two office visits in 2023 for diabetes (1st must be completed by 7/1/2023)  Two A1c levels in 2023 (1st must be completed by 7/1/2023)  Yearly lipid panel  Yearly urine microalbumin test  Diabetes education if A1c is over 8%  Taking an ACE/ARB medication if appropriate  Taking a statin medication if appropriate  Pneumonia vaccine up to date. Guidelines changed, talk with provider. Yearly flu shot (for 0244-7850 season)  Medication adherence over 70%. Patients who fall below 70% will be contacted by a pharmacist in the program to discuss any issues. Are you currently using [address] [place] [address] (home delivery pharmacy) to get your prescriptions? Yes    Would you like to speak with a pharmacist about the program, your diabetes, and/or your prescriptions? No    [address] [facility] Clinical Pharmacy Team  Phone: toll free [phone], option #3  Fax [phone]  Email: [email or web address]     For Pharmacy Admin Tracking Only    Program: [address]:  No  Gap Closed?: Yes   Time Spent (min): 15

## 2023-07-06 ENCOUNTER — TELEPHONE (OUTPATIENT)
Dept: PHARMACY | Facility: CLINIC | Age: 48
End: 2023-07-06

## 2023-07-12 ENCOUNTER — HOSPITAL ENCOUNTER (OUTPATIENT)
Age: 48
Discharge: HOME | End: 2023-07-12
Payer: COMMERCIAL

## 2023-07-12 LAB
ALBUMIN SERPL-MCNC: 4.2 G/DL (ref 3.5–5.2)
ALP SERPL-CCNC: 122 U/L (ref 35–104)
ALT SERPL-CCNC: 12 U/L (ref 0–32)
ANION GAP SERPL CALCULATED.3IONS-SCNC: 13 MMOL/L (ref 7–16)
AST SERPL-CCNC: 17 U/L (ref 0–31)
BASOPHILS # BLD: 0.06 E9/L (ref 0–0.2)
BASOPHILS NFR BLD: 0.9 % (ref 0–2)
BILIRUB SERPL-MCNC: 0.3 MG/DL (ref 0–1.2)
BUN SERPL-MCNC: 8 MG/DL (ref 6–20)
CALCIUM SERPL-MCNC: 9.4 MG/DL (ref 8.6–10.2)
CHLORIDE SERPL-SCNC: 96 MMOL/L (ref 98–107)
CHOLESTEROL, TOTAL: 151 MG/DL (ref 0–199)
CO2 SERPL-SCNC: 26 MMOL/L (ref 22–29)
CREAT SERPL-MCNC: 0.7 MG/DL (ref 0.5–1)
EOSINOPHIL # BLD: 0.33 E9/L (ref 0.05–0.5)
EOSINOPHIL NFR BLD: 4.7 % (ref 0–6)
ERYTHROCYTE [DISTWIDTH] IN BLOOD BY AUTOMATED COUNT: 11.7 FL (ref 11.5–15)
GLUCOSE SERPL-MCNC: 164 MG/DL (ref 74–99)
HBA1C MFR BLD: 7.6 % (ref 4–5.6)
HCT VFR BLD AUTO: 45.4 % (ref 34–48)
HDLC SERPL-MCNC: 74 MG/DL
HGB BLD-MCNC: 14.4 G/DL (ref 11.5–15.5)
IMM GRANULOCYTES # BLD: 0.03 E9/L
IMM GRANULOCYTES NFR BLD: 0.4 % (ref 0–5)
LDLC SERPL CALC-MCNC: 61 MG/DL (ref 0–99)
LYMPHOCYTES # BLD: 2.56 E9/L (ref 1.5–4)
LYMPHOCYTES NFR BLD: 36.7 % (ref 20–42)
MCH RBC QN AUTO: 29.6 PG (ref 26–35)
MCHC RBC AUTO-ENTMCNC: 31.7 % (ref 32–34.5)
MCV RBC AUTO: 93.4 FL (ref 80–99.9)
MONOCYTES # BLD: 0.39 E9/L (ref 0.1–0.95)
MONOCYTES NFR BLD: 5.6 % (ref 2–12)
NEUTROPHILS # BLD: 3.6 E9/L (ref 1.8–7.3)
NEUTS SEG NFR BLD: 51.7 % (ref 43–80)
PLATELET # BLD AUTO: 405 E9/L (ref 130–450)
PMV BLD AUTO: 10.2 FL (ref 7–12)
POTASSIUM SERPL-SCNC: 4.1 MMOL/L (ref 3.5–5)
PROT SERPL-MCNC: 7 G/DL (ref 6.4–8.3)
RBC # BLD AUTO: 4.86 E12/L (ref 3.5–5.5)
SODIUM SERPL-SCNC: 135 MMOL/L (ref 132–146)
TRIGL SERPL-MCNC: 82 MG/DL (ref 0–149)
TSH SERPL-MCNC: 1.68 UIU/ML (ref 0.27–4.2)
VLDLC SERPL CALC-MCNC: 16 MG/DL
WBC # BLD: 7 E9/L (ref 4.5–11.5)

## 2023-07-12 PROCEDURE — 80061 LIPID PANEL: CPT

## 2023-07-12 PROCEDURE — 84443 ASSAY THYROID STIM HORMONE: CPT

## 2023-07-12 PROCEDURE — 80053 COMPREHEN METABOLIC PANEL: CPT

## 2023-07-12 PROCEDURE — 85025 COMPLETE CBC W/AUTO DIFF WBC: CPT

## 2023-07-12 PROCEDURE — 36415 COLL VENOUS BLD VENIPUNCTURE: CPT

## 2023-07-12 PROCEDURE — 83036 HEMOGLOBIN GLYCOSYLATED A1C: CPT

## 2023-10-10 ENCOUNTER — TELEPHONE (OUTPATIENT)
Dept: PHARMACY | Facility: CLINIC | Age: 48
End: 2023-10-10

## 2023-10-10 NOTE — TELEPHONE ENCOUNTER
[facility] Employee Diabetes Program    Delia Ibrahim is a 57 y.o. female enrolled in the [facility] with Diabetes Program. The goal of this voluntary program is to help employees and covered dependents reach their health maintenance goals in regards to their diabetes diagnosis. According to our records, patient is missing the following requirement(s) that must be completed by December 31, 2023 to avoid discharge from the program:    Second diabetes visit with your provider in 2023  Second A1c result in 2023  Urine Microalbumin  Taking an ACEi/ARB, have a contraindication, or valid override     Plan:  Attempt made to reach patient by telephone to review above. Left voice message for patient to return clinician's phone call to [phone], option 3. [facility] message sent.     Cheryle Blazing, PharmD, [address] Pharmacist  Department: [address] [facility] [address] Only    Program: [address] [place] [address]  Time Spent (min): 5

## 2023-12-05 ENCOUNTER — TELEPHONE (OUTPATIENT)
Dept: PHARMACY | Facility: CLINIC | Age: 48
End: 2023-12-05

## 2023-12-05 NOTE — TELEPHONE ENCOUNTER
[facility] Employee Diabetes Program    Juan Jose Yeager is a 57 y.o. female enrolled in the [facility] with Diabetes Program. The goal of this voluntary program is to help employees and covered dependents reach their health maintenance goals in regards to their diabetes diagnosis. According to our records, patient is missing the following requirement(s) that must be completed by December 31, 2023 to avoid discharge from the program:    Second diabetes visit with your provider in 2023  Second A1c result in 2023  Urine Microalbumin  Taking an ACEi/ARB, have a contraindication, or valid override (Need results of urine microalbumin to assess this)     Plan:  Attempt made to reach patient by telephone to review above. Left voice message for patient to return clinician's phone call to [phone], option 3. MyChart message sent.     Anderson Landau, PharmD, [address] Pharmacist  Department: [place] Only    Program: [address] [place] [address]  Time Spent (min): 10

## 2023-12-06 ENCOUNTER — HOSPITAL ENCOUNTER (OUTPATIENT)
Age: 48
Discharge: HOME | End: 2023-12-06
Payer: COMMERCIAL

## 2023-12-06 LAB
ALBUMIN SERPL-MCNC: 4 G/DL (ref 3.5–5.2)
ALP SERPL-CCNC: 147 U/L (ref 35–104)
ALT SERPL-CCNC: 18 U/L (ref 0–32)
ANION GAP SERPL CALCULATED.3IONS-SCNC: 11 MMOL/L (ref 7–16)
AST SERPL-CCNC: 17 U/L (ref 0–31)
BASOPHILS # BLD: 0.06 K/UL (ref 0–0.2)
BASOPHILS NFR BLD: 1 % (ref 0–2)
BILIRUB SERPL-MCNC: 0.3 MG/DL (ref 0–1.2)
BUN SERPL-MCNC: 8 MG/DL (ref 6–20)
CALCIUM SERPL-MCNC: 9.2 MG/DL (ref 8.6–10.2)
CHLORIDE SERPL-SCNC: 96 MMOL/L (ref 98–107)
CHOLEST SERPL-MCNC: 151 MG/DL
CO2 SERPL-SCNC: 26 MMOL/L (ref 22–29)
CREAT SERPL-MCNC: 0.8 MG/DL (ref 0.5–1)
EOSINOPHIL # BLD: 0.12 K/UL (ref 0.05–0.5)
EOSINOPHILS RELATIVE PERCENT: 2 % (ref 0–6)
ERYTHROCYTE [DISTWIDTH] IN BLOOD BY AUTOMATED COUNT: 11.7 % (ref 11.5–15)
GFR SERPL CREATININE-BSD FRML MDRD: >60 ML/MIN/1.73M2
GLUCOSE SERPL-MCNC: 192 MG/DL (ref 74–99)
HBA1C MFR BLD: 8.7 % (ref 4–5.6)
HCT VFR BLD AUTO: 43.6 % (ref 34–48)
HDLC SERPL-MCNC: 79 MG/DL
HGB BLD-MCNC: 14 G/DL (ref 11.5–15.5)
IMM GRANULOCYTES # BLD AUTO: 0.03 K/UL (ref 0–0.58)
IMM GRANULOCYTES NFR BLD: 0 % (ref 0–5)
LDLC SERPL CALC-MCNC: 56 MG/DL
LYMPHOCYTES NFR BLD: 2.33 K/UL (ref 1.5–4)
LYMPHOCYTES RELATIVE PERCENT: 28 % (ref 20–42)
MCH RBC QN AUTO: 29.5 PG (ref 26–35)
MCHC RBC AUTO-ENTMCNC: 32.1 G/DL (ref 32–34.5)
MCV RBC AUTO: 91.8 FL (ref 80–99.9)
MICROALBUMIN UR-MCNC: <12 MG/L (ref 0–19)
MONOCYTES NFR BLD: 0.4 K/UL (ref 0.1–0.95)
MONOCYTES NFR BLD: 5 % (ref 2–12)
NEUTROPHILS NFR BLD: 64 % (ref 43–80)
NEUTS SEG NFR BLD: 5.26 K/UL (ref 1.8–7.3)
PLATELET # BLD AUTO: 425 K/UL (ref 130–450)
PMV BLD AUTO: 9.8 FL (ref 7–12)
POTASSIUM SERPL-SCNC: 4 MMOL/L (ref 3.5–5)
PROT SERPL-MCNC: 7.2 G/DL (ref 6.4–8.3)
RBC # BLD AUTO: 4.75 M/UL (ref 3.5–5.5)
SODIUM SERPL-SCNC: 133 MMOL/L (ref 132–146)
TRIGL SERPL-MCNC: 80 MG/DL
TSH SERPL DL<=0.05 MIU/L-ACNC: 0.9 UIU/ML (ref 0.27–4.2)
VIT B12 SERPL-MCNC: 810 PG/ML (ref 211–946)
VLDLC SERPL CALC-MCNC: 16 MG/DL
WBC OTHER # BLD: 8.2 K/UL (ref 4.5–11.5)

## 2023-12-06 PROCEDURE — 83036 HEMOGLOBIN GLYCOSYLATED A1C: CPT

## 2023-12-06 PROCEDURE — 82607 VITAMIN B-12: CPT

## 2023-12-06 PROCEDURE — 82043 UR ALBUMIN QUANTITATIVE: CPT

## 2023-12-06 PROCEDURE — 80053 COMPREHEN METABOLIC PANEL: CPT

## 2023-12-06 PROCEDURE — 85025 COMPLETE CBC W/AUTO DIFF WBC: CPT

## 2023-12-06 PROCEDURE — 36415 COLL VENOUS BLD VENIPUNCTURE: CPT

## 2023-12-06 PROCEDURE — 80061 LIPID PANEL: CPT

## 2023-12-06 PROCEDURE — 84443 ASSAY THYROID STIM HORMONE: CPT

## 2024-01-17 ENCOUNTER — TELEPHONE (OUTPATIENT)
Dept: PHARMACY | Facility: CLINIC | Age: 49
End: 2024-01-17

## 2024-01-17 NOTE — TELEPHONE ENCOUNTER
Patient is [facility]  Called patient to schedule 2024 yearly pharmacist appointment to discuss medications for Diabetes Management Program.    Spoke to patient and appointment scheduled for 1/31/24 at 3:30pm    Marcia Foster CPhT.   Population Health Clinical   [facility] Clinical Pharmacy  Toll free: [phone] Option 3     For Pharmacy Admin Tracking Only    Program: [place]  CPA in place:  No  Recommendation Provided To: Patient/Caregiver: 1 via Telephone  Intervention Detail: Scheduled Appointment  Intervention Accepted By: Patient/Caregiver: 1  Gap Closed?: Yes   Time Spent (min): 10

## 2024-01-31 ENCOUNTER — TELEPHONE (OUTPATIENT)
Dept: PHARMACY | Facility: CLINIC | Age: 49
End: 2024-01-31

## 2024-01-31 RX ORDER — ESCITALOPRAM OXALATE 10 MG/1
10 TABLET ORAL DAILY
COMMUNITY

## 2024-01-31 RX ORDER — DULAGLUTIDE 3 MG/.5ML
3 INJECTION, SOLUTION SUBCUTANEOUS WEEKLY
COMMUNITY

## 2024-01-31 RX ORDER — EMPAGLIFLOZIN AND METFORMIN HYDROCHLORIDE 5; 1000 MG/1; MG/1
1 TABLET ORAL 2 TIMES DAILY
COMMUNITY

## 2024-01-31 NOTE — TELEPHONE ENCOUNTER
[facility] CLINICAL PHARMACY REVIEW - Be Well with Diabetes ([facility])    Manuel Mcacrty is a 48 y.o. female enrolled in the [facility] Employee Diabetes Program. Patient provided writer with verbal consent to remain in the program for this year. Patient enrolled 12/19/2017.    Medications:  Current Outpatient Medications   Medication Instructions    atorvastatin (LIPITOR) 10 mg, Oral, DAILY    Empagliflozin-metFORMIN HCl (SYNJARDY) 5-1000 MG TABS 1 tablet, Oral, 2 times daily    escitalopram (LEXAPRO) 10 mg, Oral, DAILY    levothyroxine (SYNTHROID) 112 mcg, Oral, DAILY    SUMAtriptan (IMITREX) 100 mg, Oral, ONCE PRN    Trulicity 3 mg, SubCUTAneous, WEEKLY     Pharmacy and Supplies Information  Current Pharmacy: [facility]  Current Testing supplies:[employer]    Allergies:  No Known Allergies     Vitals/Labs:  BP Readings from Last 3 Encounters:   02/08/17 116/72   06/09/15 104/84   11/14/14 128/74     No results found for: \"KGIJ17ZUG\"   Lab Results   Component Value Date    MALBCR - (AA) 12/31/2022     Lab Results   Component Value Date    LABA1C 8.7 (H) 12/06/2023    LABA1C 7.6 (H) 07/12/2023    LABA1C 6.4 (H) 12/31/2022     No results found for: \"ZXR4SBAE\"  Lab Results   Component Value Date    CHOL 151 12/06/2023    TRIG 80 12/06/2023    HDL 79 12/06/2023    LDLCHOLESTEROL 56 12/06/2023    LDLCALC 61 07/12/2023     ALT   Date Value Ref Range Status   12/06/2023 18 0 - 32 U/L Final     AST   Date Value Ref Range Status   12/06/2023 17 0 - 31 U/L Final     The ASCVD Risk score (Ramonita JONES, et al., 2019) failed to calculate for the following reasons:    The systolic blood pressure is missing     Lab Results   Component Value Date    CREATININE 0.8 12/06/2023     CrCl cannot be calculated (Unknown ideal weight.).  Lab Results   Component Value Date/Time    LABGLOM >60 12/06/2023 09:50 AM    LABGLOM >60 07/12/2023 10:58 AM    LABGLOM >60 12/31/2022 10:55 AM    LABGLOM >60 06/28/2022

## 2024-04-01 ENCOUNTER — TELEPHONE (OUTPATIENT)
Dept: PHARMACY | Facility: CLINIC | Age: 49
End: 2024-04-01

## 2024-04-01 NOTE — TELEPHONE ENCOUNTER
[facility] CLINICAL PHARMACY REVIEW - BE WELL WITH DIABETES  =============================================  Manuel Mccarty is a 48 y.o. female enrolled in the [facility] Be Well with Diabetes program.     High A1c  Hemoglobin A1C   Date Value Ref Range Status   12/06/2023 8.7 (H) 4.0 - 5.6 % Final     Per last discussion, pt was under significant stress over the winter with the passing of her father.  She has been doing better with her BG since and she expects A1c to be down.      Sending quarterly check in [employer] message and deferring high A1c call until after next OV/labs.  Pt has outside provider and needs to send AVS/labs in to us    MO Joyce, PharmD, BCACP  Ambulatory Care Clinical Pharmacist- [facility] Clinical Pharmacy  Department, toll free: [phone]    For Pharmacy Admin Tracking Only    Program: [facility]  Time Spent (min): 10

## 2024-06-24 ENCOUNTER — CLINICAL DOCUMENTATION (OUTPATIENT)
Dept: PHARMACY | Facility: CLINIC | Age: 49
End: 2024-06-24

## 2024-06-24 ENCOUNTER — TELEPHONE (OUTPATIENT)
Dept: PHARMACY | Facility: CLINIC | Age: 49
End: 2024-06-24

## 2024-06-24 NOTE — PROGRESS NOTES
Pharmacy Pop Care Documentation:     Manuel Mccarty is being removed from the diabetes management program for the following reason(s):  6/24/24: Per patient she no longer has [facility] Benefits as of 5/31/24    Brittanie Lopez    For Pharmacy Admin Tracking Only    Program: [facility]  CPA in place:  No  Gap Closed?: Yes   Time Spent (min): 5

## 2024-06-24 NOTE — TELEPHONE ENCOUNTER
[facility] CLINICAL PHARMACY REVIEW - BE WELL WITH DIABETES: HIGH A1C  =============================================  Manuel Mccarty is a 48 y.o. female enrolled in the [facility] Be Well with Diabetes program.    Identified care gap(s): A1c > 8%    DM Program Prescriptions:  Current Outpatient Medications   Medication Instructions    atorvastatin (LIPITOR) 10 mg, Oral, DAILY    Empagliflozin-metFORMIN HCl (SYNJARDY) 5-1000 MG TABS 1 tablet, Oral, 2 times daily    escitalopram (LEXAPRO) 10 mg, Oral, DAILY    levothyroxine (SYNTHROID) 112 mcg, Oral, DAILY    SUMAtriptan (IMITREX) 100 mg, Oral, ONCE PRN    Trulicity 3 mg, SubCUTAneous, WEEKLY        Allergies:  No Known Allergies     Labs:    Lab Results   Component Value Date/Time    LABA1C 8.7 12/06/2023 09:50 AM    LABA1C 7.6 07/12/2023 10:58 AM    LABA1C 6.4 12/31/2022 10:55 AM    CREATININE 0.8 12/06/2023 09:50 AM    LABGLOM >60 12/06/2023 09:50 AM    MALBCR - 12/31/2022 10:55 AM      No results found for: \"[id number]\"    Care Team:   Physician (PCP): Dr. Gomez- outside [facility] (Last OV: unknown)  - Upcoming appointments:   No future appointments.    Diabetes Care:  - Glycemic Goal: <7.0%. Is not at blood glucose goal  Per last discussion, pt was under significant stress over the winter with the passing of her father. She has been doing better with her BG since and she expects A1c to be down.   - Therapy Optimization: Both Synjardy and Trulicity could be increased.  Would not recommend until pt has A1c. Reported that A1c was improving in January  - Per angelique, adherence looks good  - Pt most likely had OV this month, but needs to send documentation    Plan:  Attempt made to reach patient by telephone for diabetes review. Spoke with patient.  Informed me that she is no longer employed with [employer], but does have COBRA. Still able to get her medications at [facility].  Provided Jennifer Cortez phone number so that she can reach out to see if her card is still

## 2024-10-25 ENCOUNTER — CARE COORDINATION (OUTPATIENT)
Dept: OTHER | Facility: CLINIC | Age: 49
End: 2024-10-25

## 2024-10-25 NOTE — CARE COORDINATION
Assigned this patient from BWWD list.  Per EMR, patient no longer employed by [employer]; has [employer].  Patient no longer eligible for BWWD as of 6/24/24.  This ACM to sign off and close program.

## 2025-04-07 ENCOUNTER — HOSPITAL ENCOUNTER (OUTPATIENT)
Age: 50
Discharge: HOME | End: 2025-04-07
Payer: COMMERCIAL

## 2025-04-07 LAB
ALBUMIN SERPL-MCNC: 4 G/DL (ref 3.5–5.2)
ALP SERPL-CCNC: 184 U/L (ref 35–104)
ALT SERPL-CCNC: 8 U/L (ref 0–32)
ANION GAP SERPL CALCULATED.3IONS-SCNC: 11 MMOL/L (ref 7–16)
AST SERPL-CCNC: 12 U/L (ref 0–31)
BASOPHILS # BLD: 0.04 K/UL (ref 0–0.2)
BASOPHILS NFR BLD: 1 % (ref 0–2)
BILIRUB SERPL-MCNC: 0.4 MG/DL (ref 0–1.2)
BUN SERPL-MCNC: 15 MG/DL (ref 6–20)
CALCIUM SERPL-MCNC: 9.2 MG/DL (ref 8.6–10.2)
CHLORIDE SERPL-SCNC: 103 MMOL/L (ref 98–107)
CHOLEST SERPL-MCNC: 199 MG/DL
CO2 SERPL-SCNC: 26 MMOL/L (ref 22–29)
CREAT SERPL-MCNC: 0.7 MG/DL (ref 0.5–1)
EOSINOPHIL # BLD: 0.27 K/UL (ref 0.05–0.5)
EOSINOPHILS RELATIVE PERCENT: 4 % (ref 0–6)
ERYTHROCYTE [DISTWIDTH] IN BLOOD BY AUTOMATED COUNT: 12.4 % (ref 11.5–15)
GFR, ESTIMATED: >90 ML/MIN/1.73M2
GLUCOSE SERPL-MCNC: 185 MG/DL (ref 74–99)
HBA1C MFR BLD: 8.4 % (ref 4–5.6)
HCT VFR BLD AUTO: 42.5 % (ref 34–48)
HDLC SERPL-MCNC: 61 MG/DL
HGB BLD-MCNC: 13.8 G/DL (ref 11.5–15.5)
IMM GRANULOCYTES # BLD AUTO: 0.03 K/UL (ref 0–0.58)
IMM GRANULOCYTES NFR BLD: 0 % (ref 0–5)
LDLC SERPL CALC-MCNC: 99 MG/DL
LYMPHOCYTES NFR BLD: 1.98 K/UL (ref 1.5–4)
LYMPHOCYTES RELATIVE PERCENT: 26 % (ref 20–42)
MCH RBC QN AUTO: 28.2 PG (ref 26–35)
MCHC RBC AUTO-ENTMCNC: 32.5 G/DL (ref 32–34.5)
MCV RBC AUTO: 86.9 FL (ref 80–99.9)
MONOCYTES NFR BLD: 0.32 K/UL (ref 0.1–0.95)
MONOCYTES NFR BLD: 4 % (ref 2–12)
NEUTROPHILS NFR BLD: 65 % (ref 43–80)
NEUTS SEG NFR BLD: 4.95 K/UL (ref 1.8–7.3)
PLATELET # BLD AUTO: 380 K/UL (ref 130–450)
PMV BLD AUTO: 9.7 FL (ref 7–12)
POTASSIUM SERPL-SCNC: 3.9 MMOL/L (ref 3.5–5)
PROT SERPL-MCNC: 6.9 G/DL (ref 6.4–8.3)
RBC # BLD AUTO: 4.89 M/UL (ref 3.5–5.5)
SODIUM SERPL-SCNC: 140 MMOL/L (ref 132–146)
TRIGL SERPL-MCNC: 193 MG/DL
TSH SERPL DL<=0.05 MIU/L-ACNC: 2.94 UIU/ML (ref 0.27–4.2)
VLDLC SERPL CALC-MCNC: 39 MG/DL
WBC OTHER # BLD: 7.6 K/UL (ref 4.5–11.5)

## 2025-04-07 PROCEDURE — 80053 COMPREHEN METABOLIC PANEL: CPT

## 2025-04-07 PROCEDURE — 85025 COMPLETE CBC W/AUTO DIFF WBC: CPT

## 2025-04-07 PROCEDURE — 83036 HEMOGLOBIN GLYCOSYLATED A1C: CPT

## 2025-04-07 PROCEDURE — 80061 LIPID PANEL: CPT

## 2025-04-07 PROCEDURE — 84443 ASSAY THYROID STIM HORMONE: CPT

## 2025-05-27 ENCOUNTER — HOSPITAL ENCOUNTER (OUTPATIENT)
Dept: DIABETES SERVICES | Age: 50
Setting detail: THERAPIES SERIES
Discharge: HOME | End: 2025-05-27
Payer: COMMERCIAL

## 2025-05-27 PROCEDURE — 97802 MEDICAL NUTRITION INDIV IN: CPT

## 2025-05-27 ASSESSMENT — PROBLEM AREAS IN DIABETES QUESTIONNAIRE (PAID)
WORRYING ABOUT THE FUTURE AND THE POSSIBILITY OF SERIOUS COMPLICATIONS: MODERATE PROBLEM
FEELING SCARED WHEN YOU THINK ABOUT LIVING WITH DIABETES: MINOR PROBLEM
COPING WITH COMPLICATIONS OF DIABETES: MODERATE PROBLEM
PAID-5 TOTAL SCORE: 7
FEELING THAT DIABETES IS TAKING UP TOO MUCH OF YOUR MENTAL AND PHYSICAL ENERGY EVERY DAY: MINOR PROBLEM
FEELING DEPRESSED WHEN YOU THINK ABOUT LIVING WITH DIABETES: MINOR PROBLEM

## 2025-05-27 NOTE — PROGRESS NOTES
MNT Note for Diabetes Education    Date: 5/27/2025  Patient Name: Manuel Mccarty  Referring Clinician: Pat Gomez MD    Reason for Visit: better bg control  Sex: female    Age: 49 y.o.      History of attending [facility]: [] Yes  [x] No  Date:  History of MNT: [] Yes  [x] No   Date:    NUTRITION ASSESSMENT:    Anthropometrics:    Height: 64 inches    CBW: 180 lbs  BMI: 30.9        No past medical history on file.    No past surgical history on file.    No family history on file.     Diabetes Managed by: [] Diet only  [x] Oral agents   [] Insulin        []Injectables     []Glucometer     [] CGM    [] Insulin Pump          Diabetes Distress Score from PAID-5:   Total Score 7    []  Notified of score >8 and Contact information for [facility] provided.    Current diet/Nutrition History  Diet Recall:  B:  S:  L: 2 bottles pepsi  S:  D: 2 hot dogs, no buns, pork and beans, deviled eggs, spoonful macaroni salad, powerade zero  S: 10 ritz crackers, powerade zero    Her diet contains inadequate amounts of protein, inadequate amounts of healthy fats, inadequate amounts of green, leafy vegetables, and inadequate amounts of fruits.    Eating food from outside the home:  1 times per week    Weight History:  [x]Increased in past yr   []Decreased in past yr   []Stable    Appetite:  Big appetite    Digestive concerns:  None    Fluid Intake: >64 oz/day  States they drink mainly: powerade zero    Medications:  Prior to Admission medications    Medication Sig Start Date End Date Taking? Authorizing Provider   escitalopram (LEXAPRO) 10 MG tablet Take 1 tablet by mouth daily    Harpal Angel MD   Empagliflozin-metFORMIN HCl (SYNJARDY) 5-1000 MG TABS Take 1 tablet by mouth in the morning and at bedtime    Harpal Angel MD   Dulaglutide (TRULICITY) 3 MG/0.5ML SOPN Inject 3 mg into the skin once a week    Harpal Angel MD   atorvastatin (LIPITOR) 10 MG tablet Take 1 tablet by mouth daily